# Patient Record
Sex: MALE | Race: BLACK OR AFRICAN AMERICAN | NOT HISPANIC OR LATINO
[De-identification: names, ages, dates, MRNs, and addresses within clinical notes are randomized per-mention and may not be internally consistent; named-entity substitution may affect disease eponyms.]

---

## 2018-04-30 ENCOUNTER — TRANSCRIPTION ENCOUNTER (OUTPATIENT)
Age: 28
End: 2018-04-30

## 2018-05-19 ENCOUNTER — TRANSCRIPTION ENCOUNTER (OUTPATIENT)
Age: 28
End: 2018-05-19

## 2018-05-24 ENCOUNTER — TRANSCRIPTION ENCOUNTER (OUTPATIENT)
Age: 28
End: 2018-05-24

## 2018-06-13 ENCOUNTER — TRANSCRIPTION ENCOUNTER (OUTPATIENT)
Age: 28
End: 2018-06-13

## 2018-06-18 ENCOUNTER — EMERGENCY (EMERGENCY)
Facility: HOSPITAL | Age: 28
LOS: 1 days | Discharge: HOME | End: 2018-06-18
Attending: EMERGENCY MEDICINE
Payer: SELF-PAY

## 2018-06-18 VITALS
DIASTOLIC BLOOD PRESSURE: 78 MMHG | TEMPERATURE: 98 F | RESPIRATION RATE: 18 BRPM | HEART RATE: 66 BPM | OXYGEN SATURATION: 100 % | SYSTOLIC BLOOD PRESSURE: 130 MMHG

## 2018-06-18 VITALS — WEIGHT: 190.04 LBS

## 2018-06-18 LAB
ALBUMIN SERPL ELPH-MCNC: 4.1 G/DL — SIGNIFICANT CHANGE UP (ref 3.3–5.2)
ALP SERPL-CCNC: 85 U/L — SIGNIFICANT CHANGE UP (ref 40–120)
ALT FLD-CCNC: 18 U/L — SIGNIFICANT CHANGE UP
ANION GAP SERPL CALC-SCNC: 12 MMOL/L — SIGNIFICANT CHANGE UP (ref 5–17)
AST SERPL-CCNC: 17 U/L — SIGNIFICANT CHANGE UP
BASOPHILS # BLD AUTO: 0 K/UL — SIGNIFICANT CHANGE UP (ref 0–0.2)
BASOPHILS NFR BLD AUTO: 0.3 % — SIGNIFICANT CHANGE UP (ref 0–2)
BILIRUB SERPL-MCNC: 0.9 MG/DL — SIGNIFICANT CHANGE UP (ref 0.4–2)
BUN SERPL-MCNC: 14 MG/DL — SIGNIFICANT CHANGE UP (ref 8–20)
CALCIUM SERPL-MCNC: 9.1 MG/DL — SIGNIFICANT CHANGE UP (ref 8.6–10.2)
CHLORIDE SERPL-SCNC: 101 MMOL/L — SIGNIFICANT CHANGE UP (ref 98–107)
CO2 SERPL-SCNC: 27 MMOL/L — SIGNIFICANT CHANGE UP (ref 22–29)
CREAT SERPL-MCNC: 1 MG/DL — SIGNIFICANT CHANGE UP (ref 0.5–1.3)
EOSINOPHIL # BLD AUTO: 0 K/UL — SIGNIFICANT CHANGE UP (ref 0–0.5)
EOSINOPHIL NFR BLD AUTO: 0.8 % — SIGNIFICANT CHANGE UP (ref 0–5)
GLUCOSE SERPL-MCNC: 94 MG/DL — SIGNIFICANT CHANGE UP (ref 70–115)
HCT VFR BLD CALC: 45 % — SIGNIFICANT CHANGE UP (ref 42–52)
HGB BLD-MCNC: 15 G/DL — SIGNIFICANT CHANGE UP (ref 14–18)
LYMPHOCYTES # BLD AUTO: 2.3 K/UL — SIGNIFICANT CHANGE UP (ref 1–4.8)
LYMPHOCYTES # BLD AUTO: 35.5 % — SIGNIFICANT CHANGE UP (ref 20–55)
MCHC RBC-ENTMCNC: 30.7 PG — SIGNIFICANT CHANGE UP (ref 27–31)
MCHC RBC-ENTMCNC: 33.3 G/DL — SIGNIFICANT CHANGE UP (ref 32–36)
MCV RBC AUTO: 92 FL — SIGNIFICANT CHANGE UP (ref 80–94)
MONOCYTES # BLD AUTO: 0.8 K/UL — SIGNIFICANT CHANGE UP (ref 0–0.8)
MONOCYTES NFR BLD AUTO: 11.9 % — HIGH (ref 3–10)
NEUTROPHILS # BLD AUTO: 3.3 K/UL — SIGNIFICANT CHANGE UP (ref 1.8–8)
NEUTROPHILS NFR BLD AUTO: 51.3 % — SIGNIFICANT CHANGE UP (ref 37–73)
PLATELET # BLD AUTO: 214 K/UL — SIGNIFICANT CHANGE UP (ref 150–400)
POTASSIUM SERPL-MCNC: 3.7 MMOL/L — SIGNIFICANT CHANGE UP (ref 3.5–5.3)
POTASSIUM SERPL-SCNC: 3.7 MMOL/L — SIGNIFICANT CHANGE UP (ref 3.5–5.3)
PROT SERPL-MCNC: 6.9 G/DL — SIGNIFICANT CHANGE UP (ref 6.6–8.7)
RBC # BLD: 4.89 M/UL — SIGNIFICANT CHANGE UP (ref 4.6–6.2)
RBC # FLD: 13.4 % — SIGNIFICANT CHANGE UP (ref 11–15.6)
SODIUM SERPL-SCNC: 140 MMOL/L — SIGNIFICANT CHANGE UP (ref 135–145)
TROPONIN T SERPL-MCNC: <0.01 NG/ML — SIGNIFICANT CHANGE UP (ref 0–0.06)
WBC # BLD: 6.4 K/UL — SIGNIFICANT CHANGE UP (ref 4.8–10.8)
WBC # FLD AUTO: 6.4 K/UL — SIGNIFICANT CHANGE UP (ref 4.8–10.8)

## 2018-06-18 PROCEDURE — 93010 ELECTROCARDIOGRAM REPORT: CPT

## 2018-06-18 PROCEDURE — 99285 EMERGENCY DEPT VISIT HI MDM: CPT

## 2018-06-18 PROCEDURE — 80053 COMPREHEN METABOLIC PANEL: CPT

## 2018-06-18 PROCEDURE — 36415 COLL VENOUS BLD VENIPUNCTURE: CPT

## 2018-06-18 PROCEDURE — 84484 ASSAY OF TROPONIN QUANT: CPT

## 2018-06-18 PROCEDURE — 71046 X-RAY EXAM CHEST 2 VIEWS: CPT

## 2018-06-18 PROCEDURE — 71046 X-RAY EXAM CHEST 2 VIEWS: CPT | Mod: 26

## 2018-06-18 PROCEDURE — 93005 ELECTROCARDIOGRAM TRACING: CPT

## 2018-06-18 PROCEDURE — 99283 EMERGENCY DEPT VISIT LOW MDM: CPT

## 2018-06-18 PROCEDURE — 85027 COMPLETE CBC AUTOMATED: CPT

## 2018-06-18 NOTE — ED ADULT NURSE NOTE - CHIEF COMPLAINT QUOTE
Intermittent left sided chest pain X 2 days. Seen in [facility] ED 2 months ago for the same and was told his heart rhythm was irregular.  Has appt with cardiologist in two weeks.

## 2018-06-18 NOTE — ED PROVIDER NOTE - OBJECTIVE STATEMENT
27 yo male with no signifcant pmhx c/o chest pain off/on x 1 month. Patient reports episodes of sharp/shock like chest pain to his left chest. denies exertional symptoms. patient states sometimes I eat a cracker and get an episode and sometimes while sleeping. reports sob during the episodes.   denies any pmhx. denies smoker or drug use. denies famhx  patient went to [facility] x 2 weeks ago for similar symptoms, and had full workup including trops and echo. Patient has follow up with cardiology in 2 weeks for stress test and outpatient workup  patient reports few episodes over past 3 days. no pain at present

## 2018-06-18 NOTE — ED ADULT TRIAGE NOTE - CHIEF COMPLAINT QUOTE
Intermittent left sided chest pain X 2 days. Seen in [facility] ED 2 months ago for the same and was told his heart rhythm was irregular.  Has appt with cardiologist in two weeks.

## 2018-06-18 NOTE — ED ADULT NURSE NOTE - OBJECTIVE STATEMENT
pt comes to ED with reports of chest pain intermittently x 1 month but happening again the past 2 days. pt AOX3, ambulatory with no SOB. states he had this happen in past few months ago and was seen in [place]. pt has scheduled cardio appt in 2 weeks. no recent travel, no SOB, lungs CTAB on exam, no fevers, no coughing. pt active, otherwise healthy young man. even and unlabored resps present, skin warm dry and intact.

## 2018-07-12 ENCOUNTER — TRANSCRIPTION ENCOUNTER (OUTPATIENT)
Age: 28
End: 2018-07-12

## 2018-07-12 ENCOUNTER — EMERGENCY (EMERGENCY)
Facility: HOSPITAL | Age: 28
LOS: 1 days | Discharge: HOME | End: 2018-07-12
Attending: EMERGENCY MEDICINE
Payer: COMMERCIAL

## 2018-07-12 VITALS
RESPIRATION RATE: 18 BRPM | DIASTOLIC BLOOD PRESSURE: 86 MMHG | TEMPERATURE: 98 F | SYSTOLIC BLOOD PRESSURE: 138 MMHG | OXYGEN SATURATION: 100 % | HEART RATE: 66 BPM

## 2018-07-12 VITALS — HEIGHT: 68 IN | WEIGHT: 184.97 LBS

## 2018-07-12 LAB
ALBUMIN SERPL ELPH-MCNC: 4.3 G/DL — SIGNIFICANT CHANGE UP (ref 3.3–5.2)
ALP SERPL-CCNC: 93 U/L — SIGNIFICANT CHANGE UP (ref 40–120)
ALT FLD-CCNC: 16 U/L — SIGNIFICANT CHANGE UP
ANION GAP SERPL CALC-SCNC: 14 MMOL/L — SIGNIFICANT CHANGE UP (ref 5–17)
AST SERPL-CCNC: 17 U/L — SIGNIFICANT CHANGE UP
BASOPHILS # BLD AUTO: 0 K/UL — SIGNIFICANT CHANGE UP (ref 0–0.2)
BASOPHILS NFR BLD AUTO: 0.3 % — SIGNIFICANT CHANGE UP (ref 0–2)
BILIRUB SERPL-MCNC: 0.6 MG/DL — SIGNIFICANT CHANGE UP (ref 0.4–2)
BUN SERPL-MCNC: 16 MG/DL — SIGNIFICANT CHANGE UP (ref 8–20)
CALCIUM SERPL-MCNC: 9.3 MG/DL — SIGNIFICANT CHANGE UP (ref 8.6–10.2)
CHLORIDE SERPL-SCNC: 99 MMOL/L — SIGNIFICANT CHANGE UP (ref 98–107)
CO2 SERPL-SCNC: 26 MMOL/L — SIGNIFICANT CHANGE UP (ref 22–29)
CREAT SERPL-MCNC: 0.97 MG/DL — SIGNIFICANT CHANGE UP (ref 0.5–1.3)
EOSINOPHIL # BLD AUTO: 0 K/UL — SIGNIFICANT CHANGE UP (ref 0–0.5)
EOSINOPHIL NFR BLD AUTO: 0.8 % — SIGNIFICANT CHANGE UP (ref 0–5)
GLUCOSE SERPL-MCNC: 109 MG/DL — SIGNIFICANT CHANGE UP (ref 70–115)
HCT VFR BLD CALC: 46 % — SIGNIFICANT CHANGE UP (ref 42–52)
HGB BLD-MCNC: 15.4 G/DL — SIGNIFICANT CHANGE UP (ref 14–18)
LYMPHOCYTES # BLD AUTO: 2 K/UL — SIGNIFICANT CHANGE UP (ref 1–4.8)
LYMPHOCYTES # BLD AUTO: 32.1 % — SIGNIFICANT CHANGE UP (ref 20–55)
MCHC RBC-ENTMCNC: 30.4 PG — SIGNIFICANT CHANGE UP (ref 27–31)
MCHC RBC-ENTMCNC: 33.5 G/DL — SIGNIFICANT CHANGE UP (ref 32–36)
MCV RBC AUTO: 90.9 FL — SIGNIFICANT CHANGE UP (ref 80–94)
MONOCYTES # BLD AUTO: 0.8 K/UL — SIGNIFICANT CHANGE UP (ref 0–0.8)
MONOCYTES NFR BLD AUTO: 13.6 % — HIGH (ref 3–10)
NEUTROPHILS # BLD AUTO: 3.2 K/UL — SIGNIFICANT CHANGE UP (ref 1.8–8)
NEUTROPHILS NFR BLD AUTO: 52.9 % — SIGNIFICANT CHANGE UP (ref 37–73)
PLATELET # BLD AUTO: 244 K/UL — SIGNIFICANT CHANGE UP (ref 150–400)
POTASSIUM SERPL-MCNC: 3.9 MMOL/L — SIGNIFICANT CHANGE UP (ref 3.5–5.3)
POTASSIUM SERPL-SCNC: 3.9 MMOL/L — SIGNIFICANT CHANGE UP (ref 3.5–5.3)
PROT SERPL-MCNC: 7.4 G/DL — SIGNIFICANT CHANGE UP (ref 6.6–8.7)
RBC # BLD: 5.06 M/UL — SIGNIFICANT CHANGE UP (ref 4.6–6.2)
RBC # FLD: 13.1 % — SIGNIFICANT CHANGE UP (ref 11–15.6)
SODIUM SERPL-SCNC: 139 MMOL/L — SIGNIFICANT CHANGE UP (ref 135–145)
TROPONIN T SERPL-MCNC: <0.01 NG/ML — SIGNIFICANT CHANGE UP (ref 0–0.06)
WBC # BLD: 6.1 K/UL — SIGNIFICANT CHANGE UP (ref 4.8–10.8)
WBC # FLD AUTO: 6.1 K/UL — SIGNIFICANT CHANGE UP (ref 4.8–10.8)

## 2018-07-12 PROCEDURE — 36415 COLL VENOUS BLD VENIPUNCTURE: CPT

## 2018-07-12 PROCEDURE — 71046 X-RAY EXAM CHEST 2 VIEWS: CPT

## 2018-07-12 PROCEDURE — 80053 COMPREHEN METABOLIC PANEL: CPT

## 2018-07-12 PROCEDURE — 85027 COMPLETE CBC AUTOMATED: CPT

## 2018-07-12 PROCEDURE — 99283 EMERGENCY DEPT VISIT LOW MDM: CPT | Mod: 25

## 2018-07-12 PROCEDURE — 99285 EMERGENCY DEPT VISIT HI MDM: CPT

## 2018-07-12 PROCEDURE — 93010 ELECTROCARDIOGRAM REPORT: CPT

## 2018-07-12 PROCEDURE — 84484 ASSAY OF TROPONIN QUANT: CPT

## 2018-07-12 PROCEDURE — 93005 ELECTROCARDIOGRAM TRACING: CPT

## 2018-07-12 PROCEDURE — 71046 X-RAY EXAM CHEST 2 VIEWS: CPT | Mod: 26

## 2018-07-12 RX ORDER — SODIUM CHLORIDE 9 MG/ML
1000 INJECTION INTRAMUSCULAR; INTRAVENOUS; SUBCUTANEOUS ONCE
Qty: 0 | Refills: 0 | Status: COMPLETED | OUTPATIENT
Start: 2018-07-12 | End: 2018-07-12

## 2018-07-12 RX ORDER — ACETAMINOPHEN 500 MG
975 TABLET ORAL ONCE
Qty: 0 | Refills: 0 | Status: COMPLETED | OUTPATIENT
Start: 2018-07-12 | End: 2018-07-12

## 2018-07-12 RX ADMIN — SODIUM CHLORIDE 1000 MILLILITER(S): 9 INJECTION INTRAMUSCULAR; INTRAVENOUS; SUBCUTANEOUS at 21:20

## 2018-07-12 RX ADMIN — Medication 975 MILLIGRAM(S): at 21:19

## 2018-07-12 NOTE — ED ADULT TRIAGE NOTE - CHIEF COMPLAINT QUOTE
Pt axox3 sent to ED from urgent care for abnormal ekg. Pt states he has been having  intermittent chest pain  x 1 year, denies cardiac history and has no family history  of cardiac issues. Pt describes pain as tightness, denies any recent long periods of travel

## 2018-07-12 NOTE — ED PROVIDER NOTE - OBJECTIVE STATEMENT
29 yo male with no known pmh presents with chest pain that has been intermittent for about one year. He has not had pain for about one month and last night his pain woke him up from his sleep. He states it lasted for about 10 minutes and then subsided allowing him to rest. He described the pain as a sharp pain that is on the ight side of his chest and radiates to the middle. He rated us a 7/10 and does not note anything that aggravates or relieves the pain. Today he had another episode of pain while typing at work and once again it subsided after about 10 minutes. During the exam he states to have a tight/pressure pain present to the right side of the chest. He states this is the first time he had chest pain that woke him up from his sleep. He admits to SOB when the pain is present but denies any other symptoms including n/v, fever, chills, cough, or abdominal pain.

## 2018-07-12 NOTE — ED ADULT NURSE NOTE - OBJECTIVE STATEMENT
pt went to  urgent center for eval of intermittent chest pain for over 1 year. ref in for eval of abnormal ekg, denies chest pain , sob, diap, rad or sob. denies heavy lifting or trauma. skin warm and dry.

## 2018-08-15 ENCOUNTER — EMERGENCY (EMERGENCY)
Facility: HOSPITAL | Age: 28
LOS: 1 days | Discharge: HOME | End: 2018-08-15
Attending: EMERGENCY MEDICINE
Payer: COMMERCIAL

## 2018-08-15 VITALS
DIASTOLIC BLOOD PRESSURE: 88 MMHG | RESPIRATION RATE: 18 BRPM | SYSTOLIC BLOOD PRESSURE: 151 MMHG | WEIGHT: 179.9 LBS | OXYGEN SATURATION: 100 % | HEART RATE: 71 BPM | TEMPERATURE: 98 F | HEIGHT: 69 IN

## 2018-08-15 PROCEDURE — 93010 ELECTROCARDIOGRAM REPORT: CPT

## 2018-08-15 PROCEDURE — 84484 ASSAY OF TROPONIN QUANT: CPT

## 2018-08-15 PROCEDURE — 93005 ELECTROCARDIOGRAM TRACING: CPT

## 2018-08-15 PROCEDURE — 71046 X-RAY EXAM CHEST 2 VIEWS: CPT

## 2018-08-15 PROCEDURE — 99285 EMERGENCY DEPT VISIT HI MDM: CPT | Mod: 25

## 2018-08-15 PROCEDURE — 36415 COLL VENOUS BLD VENIPUNCTURE: CPT

## 2018-08-15 PROCEDURE — 80053 COMPREHEN METABOLIC PANEL: CPT

## 2018-08-15 NOTE — ED ADULT TRIAGE NOTE - CHIEF COMPLAINT QUOTE
pt a+ox4, brought to ED in [employer] badge number [id number] c/o chest pain. pt states "roe have chest pain all day but it recently got worse. I feel SOB too and a little dizzy."

## 2018-08-16 LAB
ALBUMIN SERPL ELPH-MCNC: 4.2 G/DL — SIGNIFICANT CHANGE UP (ref 3.3–5.2)
ALP SERPL-CCNC: 81 U/L — SIGNIFICANT CHANGE UP (ref 40–120)
ALT FLD-CCNC: 14 U/L — SIGNIFICANT CHANGE UP
ANION GAP SERPL CALC-SCNC: 11 MMOL/L — SIGNIFICANT CHANGE UP (ref 5–17)
AST SERPL-CCNC: 18 U/L — SIGNIFICANT CHANGE UP
BILIRUB SERPL-MCNC: 0.8 MG/DL — SIGNIFICANT CHANGE UP (ref 0.4–2)
BUN SERPL-MCNC: 19 MG/DL — SIGNIFICANT CHANGE UP (ref 8–20)
CALCIUM SERPL-MCNC: 9.2 MG/DL — SIGNIFICANT CHANGE UP (ref 8.6–10.2)
CHLORIDE SERPL-SCNC: 102 MMOL/L — SIGNIFICANT CHANGE UP (ref 98–107)
CO2 SERPL-SCNC: 27 MMOL/L — SIGNIFICANT CHANGE UP (ref 22–29)
CREAT SERPL-MCNC: 0.92 MG/DL — SIGNIFICANT CHANGE UP (ref 0.5–1.3)
GLUCOSE SERPL-MCNC: 98 MG/DL — SIGNIFICANT CHANGE UP (ref 70–115)
POTASSIUM SERPL-MCNC: 4 MMOL/L — SIGNIFICANT CHANGE UP (ref 3.5–5.3)
POTASSIUM SERPL-SCNC: 4 MMOL/L — SIGNIFICANT CHANGE UP (ref 3.5–5.3)
PROT SERPL-MCNC: 7.4 G/DL — SIGNIFICANT CHANGE UP (ref 6.6–8.7)
SODIUM SERPL-SCNC: 140 MMOL/L — SIGNIFICANT CHANGE UP (ref 135–145)
TROPONIN T SERPL-MCNC: <0.01 NG/ML — SIGNIFICANT CHANGE UP (ref 0–0.06)

## 2018-08-16 PROCEDURE — 71046 X-RAY EXAM CHEST 2 VIEWS: CPT | Mod: 26

## 2018-08-16 NOTE — ED ADULT NURSE NOTE - NSIMPLEMENTINTERV_GEN_ALL_ED
Implemented All Universal Safety Interventions:  [place] to call system. Call bell, personal items and telephone within reach. Instruct patient to call for assistance. Room bathroom lighting operational. Non-slip footwear when patient is off stretcher. Physically safe environment: no spills, clutter or unnecessary equipment. Stretcher in lowest position, wheels locked, appropriate side rails in place.

## 2018-08-16 NOTE — ED ADULT NURSE NOTE - CHIEF COMPLAINT QUOTE
pt a+ox4, brought to ED in [employer] badge number [id number] c/o chest pain. pt states "roe have chest pain all day but it recently got worse. I feel SOB too and a little dizzy."

## 2018-08-16 NOTE — ED PROVIDER NOTE - MEDICAL DECISION MAKING DETAILS
pt with complaint of chest pain. EKG appears unchanged from previous. will check trop/cmp and CXR. if all negative will DC and outpatient follow up with cardiologist who pt sees regularly

## 2018-08-16 NOTE — ED PROVIDER NOTE - OBJECTIVE STATEMENT
27 yo male with no known pmh presents with chest pain (had had prior visits to the ER with chest pain complaints). Pt follows with outpatient cardiologist regularly.. pt states has had extensive work up in the past with negative findings other than abnormal ekg.  Pt states chest pain frequently comes and goes, though came to ER because this time the chest pain has been lasting between 3-4 hours before relief. He describes the pain as a sharp pain that is on the right side of his chest and radiates to the middle/left.  He does not note anything that aggravates or improves chest pain. Pt also mentions SOB. Pt otherwise denies all other medical complaints. VSS. [facility], appears unchanged from prior EKGS with no acute concern for ischemia. Pt has had 3 visits now in 3 months for chest pain .

## 2018-08-16 NOTE — ED ADULT NURSE NOTE - OBJECTIVE STATEMENT
Patient A&Ox4 complaining of excessive chest pain that began appx 5 hours ago when having an argument with SO. Patient was asleep, resting comfortably in negative obvious distress upon RN approach. Patient skin color temp condition all normal, patient speaking clearly. Patient in custody of [facility] #[id number], handcuffs in place to left wrist, positive pulse, motor, sensory to extremity. Respirations even & unlabored, denies any numbness or tingling. Denies any shortness of breath, nausea or dizziness. Patient stated has been seen in ED for similar pain in [place] & July of this year, all findings negative, has not followed up with any outside care.

## 2018-08-16 NOTE — ED PROVIDER NOTE - ATTENDING CONTRIBUTION TO CARE
29 yo M presents to ED in custody of [employer] c/o CP.  Pt with multiple previous ED visits for CP with neg w/u.  No significant {PMH or risk factors for CAD. On exam pt awake and alert in NAD, Cor Reg, Lungs clear, Abd soft. NT, Ext no edema.  EKG NSR with no acute changes, Will check CXR and Trop and if neg d/c in custody of [employer]

## 2018-08-16 NOTE — ED PROVIDER NOTE - CARE PLAN
Principal Discharge DX:	Chest pain  Assessment and plan of treatment:	follow up outpatient cardio  work up negative   table for DC

## 2018-08-16 NOTE — ED PROVIDER NOTE - PROGRESS NOTE DETAILS
labs reviewed. Trop negative. Awaiting xray labs reviewed. Trop negative. xray prelim appears to be negative for any acute pathology. pt stable for DC.

## 2018-08-16 NOTE — ED PROVIDER NOTE - PHYSICAL EXAMINATION
PE: General: NAD, sitting up in bed, well groomed in nightgown. Eyes: PERRLA, EOM intact. Neck: Supple and symmetrical, no JVD. CV: RRR, no m/r/g. Lungs: CTA b/l, no use of accessory muscles, no wheezes, rales, rhonchi. Skin: warm, dry, no rashes. Abdomen: Normal BS, soft, NT/ND. Extremities: no edema, cyanosis. Musculoskeletal: moving all extremities. hand cuff to left wrist. Neuro: A & O X 3, CN 2-12 grossly intact, no sensory or motor deficit.

## 2018-09-01 ENCOUNTER — TRANSCRIPTION ENCOUNTER (OUTPATIENT)
Age: 28
End: 2018-09-01

## 2019-11-17 NOTE — ED PROVIDER NOTE - RESPIRATORY [-], MLM
Hot compress 15  Min 4 times daily, use polytrim 2 drops left eye 4 times daily, if not improving in 3-5 days follow-up with ophthalmology.   Please follow-up with Ophthalmologist    Dr. Kim Laird- Address: [address], [facility], [address] Phone: [phone]     Patient Education     Chalazion (Child)  A chalazion is a blocked, swollen oil gland in the eyelid. The eyelids have oil glands that lubricate the inside of the lids. If a gland becomes blocked, the oil builds up and causes the skin to swell.  A chalazion can vary in size. It may appear on the inside or outside of the lid. In most cases, it occurs on the upper lid. The skin may be a normal color or may be red. A chalazion is usually not painful, but it can cause discomfort, tenderness, sensitivity to light, eye discharge, and increased tearing.  A chalazion usually lasts from a few weeks to a month. It often goes away on its own. A chalazion can be mistaken for a sty (infection of an oil gland) because they both appear on the eyelid.  Why a chalazion forms  It’s often unclear why a chalazion appears. But a chalazion can develop when you have any of the following conditions:  · Chronic blepharitis, when eyelids become irritated  · Acne rosacea  · Seborrhea  · Tuberculosis  · Viral infection  Home care  If your child’s healthcare provider finds that a chalazion is infected, he or she may prescribe an antibiotic drop or ointment. Follow all instructions for giving this medicine to your child. Don’t give any medicine for this condition without first asking your child’s healthcare provider.  How to give eye medicine  · Using eye drops: Apply drops in the corner of the eye where the eyelid meets the nose. The drops will pool in this area. When your child blinks or opens his or her eyelid, the drops will flow into the eye. Use the exact number of drops prescribed. Be careful not to touch the eye or eyelashes with the dropper.  · Using  ointment: If both drops and ointment are prescribed, give the drops first. Wait 3 minutes, then apply the ointment. Doing this will give each medicine time to work. To apply the ointment, start by gently pulling down the lower lid. Place a thin line of ointment along the inside of the lid. Begin at the nose and move outward. Close the lid. Wipe away excess medicine from the nose area outward. This is to keep the eyes as clean as possible. Have your child keep the eye closed for 1 or 2 minutes, so the medicine has time to coat the eye. Eye ointment may cause blurry vision. This is normal. Apply ointment right before your child goes to sleep. If your child is an infant, ointment may be easier to apply while he or she is sleeping.  Follow these guidelines when caring for your child at home:  · Wash your hands carefully with soap and warm water before and after caring for your child’s eyes. This is to help prevent infection.  · Apply a warm moist towel or compress for 10 to 15 minutes, 3 to 4 times a day. A warm compress is a clean towel damp with warm water.  · After the warm compress or as directed by the healthcare provider, gently massage the area to help drain the chalazion. An older child may be able to massage his or her own eyelid with adult supervision.  · You and your child should never try to pop or squeeze the chalazion.  · As applicable, your child should not wear eye makeup until the chalazion has healed, or follow your healthcare provider’s directions.  · As applicable, your child should not wear contact lens until the chalazion has healed, or follow your healthcare provider’s directions.  · Once a day, with eyes closed, clean your child's eyelids with baby shampoo or a moist eyelid cleansing wipe. Ask your healthcare provider about products to clean eyelids. This helps prevent the return of a chalazion and clogging of the eyelid duct.  · Encourage your child to wash his or her hands regularly. This helps  reduce the chance of dirt and bacteria coming into contact with the eyelid.  Follow-up care  Follow up with your child’s healthcare provider, or as advised. If the chalazion does not heal in 4 weeks, your child may be referred to a healthcare provider who specializes in eye care (an optometrist or ophthalmologist) for further evaluation and treatment. Your child may also see an eye specialist if he or she has a large chalazion.  Special note for parents  Carefully wash your hands with soap and warm water before and after caring for your child’s eyes, to avoid spreading infection. Make sure that your child washes his or her own hands before and after touching the eyelid.  When to seek medical advice  For a usually healthy child, call your child's healthcare provider right away if any of these occur:  · Your child is of any age and has repeated fevers above 104°F (40°C)  · Your child is younger than 2 years of age and has a fever of 100.4°F (38°C) that continues for more than 1 day  · Your child is 2 years old or older and has a fever of 100.4°F (38°C) that continues for more than 3 days  · Chalazion returns to the same area repeatedly  · Existing symptoms (such as pain, warmth, redness, and drainage) don’t get better, or get worse  · New symptoms appear, such as eye pain, constant headaches, warmth or redness around the eye, drainage, or both the upper and lower lids of the same eye swelling  · Vision changes, such as trouble seeing or blurred vision  Call 911  Call 911 if any of these occur:  · Trouble breathing  · Confusion  · Extreme drowsiness or trouble awakening  · Fainting or loss of consciousness  · Rapid heart rate  · Seizure  · Stiff neck  Date Last Reviewed: 10/9/2015  © 6298-7173 [employer]. [address] [place] [address], [place] [address]. All rights reserved. This information is not intended as a substitute for professional medical care. Always follow your healthcare professional's  instructions.              no cough

## 2020-01-30 ENCOUNTER — TRANSCRIPTION ENCOUNTER (OUTPATIENT)
Age: 30
End: 2020-01-30

## 2020-06-28 ENCOUNTER — EMERGENCY (EMERGENCY)
Facility: HOSPITAL | Age: 30
LOS: 1 days | Discharge: HOME | End: 2020-06-28
Attending: EMERGENCY MEDICINE
Payer: COMMERCIAL

## 2020-06-28 VITALS
SYSTOLIC BLOOD PRESSURE: 128 MMHG | HEART RATE: 78 BPM | RESPIRATION RATE: 18 BRPM | DIASTOLIC BLOOD PRESSURE: 90 MMHG | TEMPERATURE: 98 F | HEIGHT: 68 IN | WEIGHT: 195.11 LBS | OXYGEN SATURATION: 97 %

## 2020-06-28 LAB
ALBUMIN SERPL ELPH-MCNC: 4.1 G/DL — SIGNIFICANT CHANGE UP (ref 3.3–5.2)
ALP SERPL-CCNC: 105 U/L — SIGNIFICANT CHANGE UP (ref 40–120)
ALT FLD-CCNC: 17 U/L — SIGNIFICANT CHANGE UP
ANION GAP SERPL CALC-SCNC: 10 MMOL/L — SIGNIFICANT CHANGE UP (ref 5–17)
AST SERPL-CCNC: 22 U/L — SIGNIFICANT CHANGE UP
BASOPHILS # BLD AUTO: 0.04 K/UL — SIGNIFICANT CHANGE UP (ref 0–0.2)
BASOPHILS NFR BLD AUTO: 0.8 % — SIGNIFICANT CHANGE UP (ref 0–2)
BILIRUB SERPL-MCNC: 0.6 MG/DL — SIGNIFICANT CHANGE UP (ref 0.4–2)
BUN SERPL-MCNC: 16 MG/DL — SIGNIFICANT CHANGE UP (ref 8–20)
CALCIUM SERPL-MCNC: 9 MG/DL — SIGNIFICANT CHANGE UP (ref 8.6–10.2)
CHLORIDE SERPL-SCNC: 101 MMOL/L — SIGNIFICANT CHANGE UP (ref 98–107)
CO2 SERPL-SCNC: 26 MMOL/L — SIGNIFICANT CHANGE UP (ref 22–29)
CREAT SERPL-MCNC: 1.05 MG/DL — SIGNIFICANT CHANGE UP (ref 0.5–1.3)
EOSINOPHIL # BLD AUTO: 0.1 K/UL — SIGNIFICANT CHANGE UP (ref 0–0.5)
EOSINOPHIL NFR BLD AUTO: 1.9 % — SIGNIFICANT CHANGE UP (ref 0–6)
GLUCOSE SERPL-MCNC: 99 MG/DL — SIGNIFICANT CHANGE UP (ref 70–99)
HCT VFR BLD CALC: 44.8 % — SIGNIFICANT CHANGE UP (ref 39–50)
HGB BLD-MCNC: 15 G/DL — SIGNIFICANT CHANGE UP (ref 13–17)
IMM GRANULOCYTES NFR BLD AUTO: 0.2 % — SIGNIFICANT CHANGE UP (ref 0–1.5)
LYMPHOCYTES # BLD AUTO: 2.38 K/UL — SIGNIFICANT CHANGE UP (ref 1–3.3)
LYMPHOCYTES # BLD AUTO: 44.7 % — HIGH (ref 13–44)
MCHC RBC-ENTMCNC: 30.2 PG — SIGNIFICANT CHANGE UP (ref 27–34)
MCHC RBC-ENTMCNC: 33.5 GM/DL — SIGNIFICANT CHANGE UP (ref 32–36)
MCV RBC AUTO: 90.1 FL — SIGNIFICANT CHANGE UP (ref 80–100)
MONOCYTES # BLD AUTO: 0.66 K/UL — SIGNIFICANT CHANGE UP (ref 0–0.9)
MONOCYTES NFR BLD AUTO: 12.4 % — SIGNIFICANT CHANGE UP (ref 2–14)
NEUTROPHILS # BLD AUTO: 2.13 K/UL — SIGNIFICANT CHANGE UP (ref 1.8–7.4)
NEUTROPHILS NFR BLD AUTO: 40 % — LOW (ref 43–77)
PLATELET # BLD AUTO: 230 K/UL — SIGNIFICANT CHANGE UP (ref 150–400)
POTASSIUM SERPL-MCNC: 4.1 MMOL/L — SIGNIFICANT CHANGE UP (ref 3.5–5.3)
POTASSIUM SERPL-SCNC: 4.1 MMOL/L — SIGNIFICANT CHANGE UP (ref 3.5–5.3)
PROT SERPL-MCNC: 6.9 G/DL — SIGNIFICANT CHANGE UP (ref 6.6–8.7)
RBC # BLD: 4.97 M/UL — SIGNIFICANT CHANGE UP (ref 4.2–5.8)
RBC # FLD: 13.6 % — SIGNIFICANT CHANGE UP (ref 10.3–14.5)
SODIUM SERPL-SCNC: 136 MMOL/L — SIGNIFICANT CHANGE UP (ref 135–145)
TROPONIN T SERPL-MCNC: <0.01 NG/ML — SIGNIFICANT CHANGE UP (ref 0–0.06)
WBC # BLD: 5.32 K/UL — SIGNIFICANT CHANGE UP (ref 3.8–10.5)
WBC # FLD AUTO: 5.32 K/UL — SIGNIFICANT CHANGE UP (ref 3.8–10.5)

## 2020-06-28 PROCEDURE — 80053 COMPREHEN METABOLIC PANEL: CPT

## 2020-06-28 PROCEDURE — 93010 ELECTROCARDIOGRAM REPORT: CPT

## 2020-06-28 PROCEDURE — 71046 X-RAY EXAM CHEST 2 VIEWS: CPT

## 2020-06-28 PROCEDURE — 85027 COMPLETE CBC AUTOMATED: CPT

## 2020-06-28 PROCEDURE — 99284 EMERGENCY DEPT VISIT MOD MDM: CPT | Mod: 25

## 2020-06-28 PROCEDURE — 71046 X-RAY EXAM CHEST 2 VIEWS: CPT | Mod: 26

## 2020-06-28 PROCEDURE — 93005 ELECTROCARDIOGRAM TRACING: CPT

## 2020-06-28 PROCEDURE — 84484 ASSAY OF TROPONIN QUANT: CPT

## 2020-06-28 PROCEDURE — 99285 EMERGENCY DEPT VISIT HI MDM: CPT

## 2020-06-28 PROCEDURE — 36415 COLL VENOUS BLD VENIPUNCTURE: CPT

## 2020-06-28 NOTE — ED ADULT TRIAGE NOTE - CHIEF COMPLAINT QUOTE
patient states that he has been having chest pain since this am, radiating to left arm without cardiac HX denies N/V/SOB

## 2020-06-28 NOTE — ED PROVIDER NOTE - CLINICAL SUMMARY MEDICAL DECISION MAKING FREE TEXT BOX
31yo M c/o chest pain x 1 day. Well appearing, in NAD. No cardiac risk factors. No family hx. EKG non-ischemic. Plan to check cxr, labs, trop x 1 and f/u ss cardiology as outpt if workup negative

## 2020-06-28 NOTE — ED ADULT NURSE NOTE - CHPI ED NUR SYMPTOMS NEG
no chills/no congestion/no nausea/no syncope/no shortness of breath/no back pain/no diaphoresis/no dizziness/no fever/no vomiting

## 2020-06-28 NOTE — ED PROVIDER NOTE - PROGRESS NOTE DETAILS
labs unremarkable, trop negative. pt provided copy of results and instructed to f/u ss cardiology, referral provided. Return precautions given, stable for dc.

## 2020-06-28 NOTE — ED PROVIDER NOTE - NS ED ROS FT
Gen: denies fever, chills, fatigue, weight loss  Skin: denies rashes, laceration, bruising  HEENT: denies visual changes, ear pain, nasal congestion, throat pain  Respiratory: denies EASON, SOB, cough, wheezing  Cardiovascular: +CHEST PAIN. Denies palpitations, diaphoresis, LE edema  GI: denies abdominal pain, n/v/d  : denies dysuria, frequency, urgency, bowel/bladder incontinence  MSK: denies joint swelling/pain, back pain, neck pain  Neuro: denies headache, dizziness, weakness, numbness  Psych: denies anxiety, depression, SI/HI, visual/auditory hallucinations

## 2020-06-28 NOTE — ED PROVIDER NOTE - PHYSICAL EXAMINATION
Gen: WD/WN, NAD, non-toxic  Head: NCAT  Cardiac: RRR +S1S2.   Resp: Speaking in full sentences. No evidence of respiratory distress. No wheezes, rales or rhonchi.  Abd: +BS x 4. Soft, non-tender, non-distended. No guarding or rebound.  Back: Spine midline and non-tender. No CVAT.  MSK: FROM extremities x 4, no bony ttp  Skin: Warm, dry, no rashes or lesions  Neuro: Awake, alert & oriented x 3. No focal deficits, ambulatory with steady gait

## 2020-06-28 NOTE — ED PROVIDER NOTE - PATIENT PORTAL LINK FT
You can access the FollowMyHealth Patient Portal offered by [facility] by registering at the following website: [email or web address]. By joining [employer]’s FollowMyHealth portal, you will also be able to view your health information using other applications (apps) compatible with our system.

## 2020-06-28 NOTE — ED ADULT NURSE NOTE - OBJECTIVE STATEMENT
Pt. presents alert and oriented x 4 to ED complaining of episodes of chest pain earlier yesterday. At this time, pt denies pain. Pt. states his pain started when he was relaxing at home, pt reports "I took Motrin but it didn't help." Pt. states he also went for a walk but it didn't help, however it also did not make it worse. Pt. reports nothing as making the pain better or worse. Pt. states he went to bed last night and the pain woke him up out of his sleep, pt. states the pain was squeezing and radiated down his L. arm. At this time, pt in NSR on cardiac monitor. VSS. Skin warm and dry. No noted diaphoresis or pallor. Pt. GRAMAJO, ambulatory. Breathing spontaneous and unlabored on room air. Pt. denies N/V/D, difficulty breathing, dizziness, syncope.

## 2020-06-28 NOTE — ED PROVIDER NOTE - ATTENDING CONTRIBUTION TO CARE
Pt with no risk factors for ACS or PE, having mild chest discomfort today, no respiratory or GI symptoms.  ECG is unchanged from his prior and he has had recent cardiac work up for abnormal ECG. Troponin is negative. Do not suspect pedro pablo or myocarditis. Can follow up with his cardiologist if needed, otherwise supportive care, NSAIDs, return precautions.

## 2020-06-28 NOTE — ED PROVIDER NOTE - OBJECTIVE STATEMENT
31yo M no pmhx presents to ED c/o chest pain x 1 day. Pt noting left sided CP onset yesterday AM, persistent throughout day. States pain feels like a throbbing sensation, currently 4/10 in severity. No associated sob. Not worse with inspiration or positional change. Pt went to  yesterday, had ekg done and was told to come to ED because it looked "abnormal" but pt waited because he has been told in past his ekg is abnormal. Pt states he tried to rest and ignore pain but approx 45 minutes PTA pain woke pt from sleep, now with left arm pain as well. Pt states he followed up with a cardiologist in [place] 2 years ago due to ekg, had echo, stress test and holter monitor- all were normal. Non-smoker. Admits to occasional alcohol consumption, increased due to quarantine. No family hx CAD. Denies fever, sob, palpitations, le edema, numbness, ha, dizziness, abdominal pain, n/v/d, recent illness.

## 2020-06-28 NOTE — ED PROVIDER NOTE - NSFOLLOWUPCLINICS_GEN_ALL_ED_FT
[facility]  Cardiology  [address] [place] [address]  Phone: [phone]  Fax:   Follow Up Time:

## 2020-10-13 ENCOUNTER — EMERGENCY (EMERGENCY)
Facility: HOSPITAL | Age: 30
LOS: 1 days | Discharge: HOME | End: 2020-10-13
Attending: EMERGENCY MEDICINE
Payer: COMMERCIAL

## 2020-10-13 VITALS
DIASTOLIC BLOOD PRESSURE: 89 MMHG | RESPIRATION RATE: 14 BRPM | HEIGHT: 68 IN | SYSTOLIC BLOOD PRESSURE: 144 MMHG | WEIGHT: 197.98 LBS | OXYGEN SATURATION: 100 % | HEART RATE: 71 BPM | TEMPERATURE: 98 F

## 2020-10-13 PROCEDURE — 99282 EMERGENCY DEPT VISIT SF MDM: CPT

## 2020-10-13 NOTE — ED PROVIDER NOTE - ADDITIONAL NOTES AND INSTRUCTIONS:
PT was evaluated At [facility] ED and was found to have a condition that warranted time of to rest and heal from WORK/SCHOOL.   Burak Alvarez PA-C

## 2020-10-13 NOTE — ED PROVIDER NOTE - CHPI ED SYMPTOMS NEG
no chest pain/no cough/no chills/no back pain/no dizziness/no fever/no diaphoresis/no nausea/no syncope/no shortness of breath/no vomiting

## 2020-10-13 NOTE — ED PROVIDER NOTE - ATTENDING CONTRIBUTION TO CARE
I, Patric Echeverria, have personally performed a face to face diagnostic evaluation on this patient. I have reviewed the ACP note and agree with the history, exam and plan of care, except as noted.    29 yo M p/w hypertension. Patient not on medication. asymptomatic. BP normotensive in the ED. patient given cardiology follow up.

## 2020-10-13 NOTE — ED PROVIDER NOTE - CLINICAL SUMMARY MEDICAL DECISION MAKING FREE TEXT BOX
PT with stable VS, no acute distress, non toxic appearing, tolerating PO in the ED, Pt informed of elevated BP and the need for further follow up to PCP for management has no CP, SOB, diff breathing, cough, ABD pain, dizziness, change in vision, back pain, HA. PT educated that at this time there is no indication for emergent management of BP, and the importance ot BP control over long term due to potential for long term complication from uncontrolled HTN. PT verbalizes that they understand all information given to them. educated about when to return to the ED if needed. PT verbalizes that he understands all instructions and results. Pt informed that ED is open and available 24/7 365 days a yr, encouraged to return to the ED if they have any change in condition, or feel the need for revaluation.

## 2020-10-13 NOTE — ED PROVIDER NOTE - OBJECTIVE STATEMENT
PT with no SPMHx presents to the ED with complaint of elevated BP since yesterday. Pt states that he states he was feeling "off" so he went to [facility] and was found to be hypertensive was dc home with supportive care. Pt states that he took his BP at home today multiple times and felt that his BP was elevated highest finding was 144/100mmHg. Pt states that he feels completely normal now and has no complaints, Pt dines numbness, tinglign, HA, dizziness, SOB, CP, change in vision, ABD pain, cough.

## 2020-10-13 NOTE — ED PROVIDER NOTE - NSFOLLOWUPCLINICS_GEN_ALL_ED_FT
[facility]  Cardiology  [address] [place] [address]  Phone: [phone]  Fax:   Follow Up Time:

## 2020-10-13 NOTE — ED PROVIDER NOTE - NSFOLLOWUPINSTRUCTIONS_ED_ALL_ED_FT
Patient education: High blood pressure emergencies (The Basics)  View in Iranian  Written by the doctors and editors at [facility]  What is a high blood pressure emergency?  A high blood pressure emergency is a serious – and even life-threatening – condition that can happen when a person's blood pressure gets much higher than normal. When a person's blood pressure gets very high, it can lead to problems in one or more of the following organs:    ?Eyes – Problems can include bleeding in the back of the eye, or swelling of the nerve that runs from the eye to the brain.    ?Brain – Problems can include swelling or bleeding in the brain, or a stroke. A stroke is when a part of the brain is damaged because of a problem with blood flow.    ?Kidneys – Very high blood pressure can lead to kidney failure, which is when the kidneys stop working.    ?Heart – Heart problems can include a heart attack, heart failure, or damage to a major blood vessel.    When your doctor or nurse tells you your blood pressure, they say 2 numbers. For example, your doctor might say that your blood pressure is "140 over 90." When people have a high blood pressure emergency, their blood pressure is usually "180 over 120" or higher.    Other terms doctors might use for a high blood pressure emergency are "hypertensive emergency" or "malignant hypertension."    Sometimes, a person's blood pressure is much higher than normal, but it hasn't damaged any organs. Doctors call this "hypertensive urgency." Hypertensive urgency is not usually treated the same as a high blood pressure emergency.    What are the symptoms of a high blood pressure emergency?  The symptoms depend on the organ or organs affected. They can include:    ?Blurry vision or other vision changes    ?Headache    ?Nausea or vomiting    ?Confusion    ?Passing out or seizures – Seizures are waves of abnormal electrical activity in the brain that can make people move or behave strangely.    ?Weakness or numbness on one side of the body, or in one arm or leg    ?Difficulty talking    ?Trouble breathing    ?Chest pain    ?Pain in the upper back or between the shoulders    ?Urine that is brown or bloody    ?Pain in the lower back or on the side of the body    Should I see a doctor or nurse?  Yes. Call your doctor or nurse right away if you have any of the symptoms listed above, especially if you know that you have high blood pressure.    Will I need tests?  Yes. Your doctor or nurse will ask about your symptoms, do an exam, and check your blood pressure. They might use a special light to look in the back of your eyes.    Your doctor will also do tests to check how serious your condition is. Tests can include:    ?Blood tests    ?Urine tests    ?A chest X-ray    ?A CT scan or other imaging test of your brain – Imaging tests create pictures of the inside of the body.    ?A CT scan or other imaging test of your chest    ?An ECG (also called an "electrocardiogram") – This test measures the electrical activity in your heart (figure 1).    How is a high blood pressure emergency treated?  A high blood pressure emergency is treated in the hospital. Your doctor will give you medicines to lower your blood pressure quickly. These medicines are usually given through a thin tube that goes into your vein, called an "IV."    Your doctor will also treat any problems caused by your very high blood pressure, if they can be treated.    People who have a high blood pressure emergency usually need long-term treatment to keep their blood pressure under control. This usually includes:    ?Taking medicines    ?Following a low-salt diet that includes a lot of fruits and vegetables    ?Losing weight (if you are overweight)    ?Getting regular exercise

## 2020-10-13 NOTE — ED PROVIDER NOTE - PATIENT PORTAL LINK FT
You can access the FollowMyHealth Patient Portal offered by [facility] by registering at the following website: [email or web address]. By joining [employer]’s FollowMyHealth portal, you will also be able to view your health information using other applications (apps) compatible with our system.

## 2020-12-16 ENCOUNTER — EMERGENCY (EMERGENCY)
Facility: HOSPITAL | Age: 30
LOS: 1 days | Discharge: HOME | End: 2020-12-16
Payer: COMMERCIAL

## 2020-12-16 VITALS
HEART RATE: 79 BPM | RESPIRATION RATE: 18 BRPM | HEIGHT: 68 IN | SYSTOLIC BLOOD PRESSURE: 158 MMHG | WEIGHT: 199.96 LBS | DIASTOLIC BLOOD PRESSURE: 95 MMHG | OXYGEN SATURATION: 100 % | TEMPERATURE: 98 F

## 2020-12-16 LAB
S PYO DNA THROAT QL NAA+PROBE: SIGNIFICANT CHANGE UP
SARS-COV-2 RNA SPEC QL NAA+PROBE: SIGNIFICANT CHANGE UP

## 2020-12-16 PROCEDURE — 99284 EMERGENCY DEPT VISIT MOD MDM: CPT

## 2020-12-16 PROCEDURE — 87651 STREP A DNA AMP PROBE: CPT

## 2020-12-16 PROCEDURE — 99283 EMERGENCY DEPT VISIT LOW MDM: CPT

## 2020-12-16 PROCEDURE — U0003: CPT

## 2020-12-16 PROCEDURE — 87798 DETECT AGENT NOS DNA AMP: CPT

## 2020-12-16 NOTE — ED PROVIDER NOTE - OBJECTIVE STATEMENT
Pt is a 29 y/o m, denies PMH, presents to ED requesting COVID-19 testing. Pt states he was exposed to a person who tested + for covid-19 3 days ago and he would like to be tested at this time. Pt currently c/o of a sore throat. Pt has not taken any medication in attempt to alleviate symptoms. Pt tolerating PO intake / secretions and has bene w/o vomiting since onset of symptoms. Pt has no other complaints at this time. Denies fevers, chills, SOB, HA, dizziness, nausea, vomiting, belly pain, recent travel.

## 2020-12-16 NOTE — ED PROVIDER NOTE - NSFOLLOWUPINSTRUCTIONS_ED_ALL_ED_FT
- Return to the ED for any new or worsening symptoms including but not limited to difficulty breathing, severe weakness, confusion, etc.  - Take Tylenol (Acetaminophen) 650mg as needed for pain or fever  - Stay well hydrated.   - Avoid contact with anybody who is sick OR at risk populations including elderly, young, pregnant patients, immune compromised people  - Wash hands frequently in warm soapy water for at least 20 seconds   - Quarantine yourself at home for at least 2 weeks      Viral Respiratory Infection    A viral respiratory infection is an illness that affects parts of the body used for breathing, like the lungs, nose, and throat. It is caused by a germ called a virus. Symptoms can include runny nose, coughing, sneezing, fatigue, body aches, sore throat, fever, or headache. Over the counter medicine can be used to manage the symptoms but the infection typically goes away on its own in 5 to 10 days.     SEEK IMMEDIATE MEDICAL CARE IF YOU HAVE ANY OF THE FOLLOWING SYMPTOMS: shortness of breath, chest pain, fever over 10 days, or lightheadedness/dizziness.

## 2020-12-16 NOTE — ED PROVIDER NOTE - PATIENT PORTAL LINK FT
You can access the FollowMyHealth Patient Portal offered by [facility] by registering at the following website: [email or web address]. By joining [employer]’s FollowMyHealth portal, you will also be able to view your health information using other applications (apps) compatible with our system.

## 2020-12-16 NOTE — ED PROVIDER NOTE - CLINICAL SUMMARY MEDICAL DECISION MAKING FREE TEXT BOX
30m with sore throat x 3 days requesting COVID-19 testing. Throat clear, no erythema / exudates. VSS. COVID-19 and Strep PCR sent. Pt given excuse from work form and given strict return precautions.

## 2020-12-27 ENCOUNTER — EMERGENCY (EMERGENCY)
Facility: HOSPITAL | Age: 30
LOS: 1 days | Discharge: HOME | End: 2020-12-27
Payer: COMMERCIAL

## 2020-12-27 VITALS
DIASTOLIC BLOOD PRESSURE: 89 MMHG | HEIGHT: 68 IN | TEMPERATURE: 98 F | SYSTOLIC BLOOD PRESSURE: 148 MMHG | RESPIRATION RATE: 18 BRPM | WEIGHT: 199.96 LBS | HEART RATE: 83 BPM | OXYGEN SATURATION: 99 %

## 2020-12-27 LAB — SARS-COV-2 RNA SPEC QL NAA+PROBE: SIGNIFICANT CHANGE UP

## 2020-12-27 PROCEDURE — U0003: CPT

## 2020-12-27 PROCEDURE — 99283 EMERGENCY DEPT VISIT LOW MDM: CPT

## 2020-12-27 NOTE — ED ADULT TRIAGE NOTE - CHIEF COMPLAINT QUOTE
Pt requesting Covid test. His parents that he lives with both tested positive yesterday. Pt is asymptomatic.

## 2020-12-27 NOTE — ED PROVIDER NOTE - CLINICAL SUMMARY MEDICAL DECISION MAKING FREE TEXT BOX
will send covid testing     PT COVID (and/or RVP) swab(s) obtained.  Advised home quarantine until test results available.  If test positive, requires home quarantine. Anticipatory guidance provided and supportive care recommended. Advised immediate return if worsening symptoms, including and not limited to sever chest pain, worsening shortness of breath, fever not reduced with OTC antipyretic use, inability to tolerate food or liquid.

## 2020-12-27 NOTE — ED PROVIDER NOTE - OBJECTIVE STATEMENT
31 y/o male presents for COVID. He had a exposure to his parents who are positive for COVId. He is asymptomatic

## 2020-12-27 NOTE — ED PROVIDER NOTE - PATIENT PORTAL LINK FT
You can access the FollowMyHealth Patient Portal offered by [facility] by registering at the following website: [email or web address]. By joining [employer]’s FollowMyHealth portal, you will also be able to view your health information using other applications (apps) compatible with our system.

## 2021-01-03 ENCOUNTER — EMERGENCY (EMERGENCY)
Facility: HOSPITAL | Age: 31
LOS: 1 days | Discharge: HOME | End: 2021-01-03
Payer: COMMERCIAL

## 2021-01-03 VITALS
DIASTOLIC BLOOD PRESSURE: 89 MMHG | SYSTOLIC BLOOD PRESSURE: 147 MMHG | TEMPERATURE: 98 F | OXYGEN SATURATION: 100 % | RESPIRATION RATE: 16 BRPM | HEART RATE: 74 BPM

## 2021-01-03 VITALS — HEIGHT: 68 IN

## 2021-01-03 LAB — SARS-COV-2 RNA SPEC QL NAA+PROBE: SIGNIFICANT CHANGE UP

## 2021-01-03 PROCEDURE — 99283 EMERGENCY DEPT VISIT LOW MDM: CPT

## 2021-01-03 PROCEDURE — U0005: CPT

## 2021-01-03 PROCEDURE — U0003: CPT

## 2021-01-05 NOTE — ED PROVIDER NOTE - CLINICAL SUMMARY MEDICAL DECISION MAKING FREE TEXT BOX
PT COVID (and/or RVP) swab(s) obtained.  Advised home quarantine until test results available.  If test positive, requires home quarantine. Anticipatory guidance provided and supportive care recommended. Advised immediate return if worsening symptoms, including and not limited to sever chest pain, worsening shortness of breath, fever not reduced with OTC antipyretic use, inability to tolerate food or liquid. PT provided with written COVID discharge instructions and instructions on how to obtain COVID results. Advised to contact PMD within 24 hoiurs.

## 2021-01-05 NOTE — ED PROVIDER NOTE - PATIENT PORTAL LINK FT
You can access the FollowMyHealth Patient Portal offered by [facility] by registering at the following website: [email or web address]. By joining [employer]’s FollowMyHealth portal, you will also be able to view your health information using other applications (apps) compatible with our system.

## 2021-01-08 ENCOUNTER — EMERGENCY (EMERGENCY)
Facility: HOSPITAL | Age: 31
LOS: 1 days | Discharge: HOME | End: 2021-01-08
Payer: COMMERCIAL

## 2021-01-08 ENCOUNTER — TRANSCRIPTION ENCOUNTER (OUTPATIENT)
Age: 31
End: 2021-01-08

## 2021-01-08 VITALS
TEMPERATURE: 98 F | DIASTOLIC BLOOD PRESSURE: 97 MMHG | SYSTOLIC BLOOD PRESSURE: 156 MMHG | RESPIRATION RATE: 18 BRPM | HEART RATE: 100 BPM | HEIGHT: 68 IN | OXYGEN SATURATION: 100 %

## 2021-01-08 LAB — SARS-COV-2 RNA SPEC QL NAA+PROBE: DETECTED

## 2021-01-08 PROCEDURE — 99283 EMERGENCY DEPT VISIT LOW MDM: CPT

## 2021-01-08 PROCEDURE — U0003: CPT

## 2021-01-08 PROCEDURE — 99284 EMERGENCY DEPT VISIT MOD MDM: CPT

## 2021-01-08 PROCEDURE — U0005: CPT

## 2021-01-16 NOTE — ED PROVIDER NOTE - PATIENT PORTAL LINK FT
You can access the FollowMyHealth Patient Portal offered by [facility] by registering at the following website: [email or web address]. By joining [employer]’s FollowMyHealth portal, you will also be able to view your health information using other applications (apps) compatible with our system.

## 2021-12-27 ENCOUNTER — EMERGENCY (EMERGENCY)
Facility: HOSPITAL | Age: 31
LOS: 1 days | Discharge: HOME | End: 2021-12-27
Payer: COMMERCIAL

## 2021-12-27 VITALS
DIASTOLIC BLOOD PRESSURE: 98 MMHG | OXYGEN SATURATION: 100 % | HEART RATE: 62 BPM | TEMPERATURE: 98 F | WEIGHT: 205.91 LBS | SYSTOLIC BLOOD PRESSURE: 154 MMHG | HEIGHT: 68 IN | RESPIRATION RATE: 18 BRPM

## 2021-12-27 LAB — SARS-COV-2 RNA SPEC QL NAA+PROBE: DETECTED

## 2021-12-27 PROCEDURE — U0003: CPT

## 2021-12-27 PROCEDURE — U0005: CPT

## 2021-12-27 PROCEDURE — 99282 EMERGENCY DEPT VISIT SF MDM: CPT

## 2021-12-27 PROCEDURE — 99283 EMERGENCY DEPT VISIT LOW MDM: CPT

## 2021-12-27 NOTE — ED PROVIDER NOTE - PATIENT PORTAL LINK FT
You can access the FollowMyHealth Patient Portal offered by [facility] by registering at the following website: [email or web address]. By joining [employer]’s FollowMyHealth portal, you will also be able to view your health information using other applications (apps) compatible with our system.

## 2021-12-27 NOTE — ED PROVIDER NOTE - CLINICAL SUMMARY MEDICAL DECISION MAKING FREE TEXT BOX
Pt nontoxic appearing, stable vitals, ambulatory with stable saturation without supplemental oxygen. PT does not meet criteria listed in most updated guidelines as per [facility] protocol/algorithm for admission at this time. pt advised about self-quarantine instructions until negative test results and/or symptom resolution. pt advised on hand hygiene, monitoring of symptoms, antipyretic use as well as and fu with primary care provider. Instructions given in pre-printed copy.

## 2021-12-27 NOTE — ED PROVIDER NOTE - OBJECTIVE STATEMENT
32 y/o M presents to ED for COVID testing. Pt currently asymptomatic. No chest pain, sob, cough, abd pain, n/v/d, headache, dizziness. + sick contacts. No recent travel. Pt well appearing. NAD.

## 2021-12-27 NOTE — ED PROVIDER NOTE - PHYSICAL EXAMINATION
general: Well appearing, awake, alert, oriented to person, place, time/situation and in no apparent distress.  ENT: Airway patent, Nasal mucosa clear.  Eye: Clear bilaterally,  Cardiac: Normal rate,  Respiratory: no respiratory distress  neuro: Alert and oriented,

## 2022-01-16 ENCOUNTER — TRANSCRIPTION ENCOUNTER (OUTPATIENT)
Age: 32
End: 2022-01-16

## 2022-03-26 ENCOUNTER — TRANSCRIPTION ENCOUNTER (OUTPATIENT)
Age: 32
End: 2022-03-26

## 2022-09-20 ENCOUNTER — EMERGENCY (EMERGENCY)
Facility: HOSPITAL | Age: 32
LOS: 1 days | Discharge: LEFT BEFORE DISCHARGE | End: 2022-09-20

## 2022-09-20 VITALS
RESPIRATION RATE: 18 BRPM | OXYGEN SATURATION: 100 % | HEIGHT: 68 IN | HEART RATE: 71 BPM | WEIGHT: 219.58 LBS | TEMPERATURE: 98 F | DIASTOLIC BLOOD PRESSURE: 95 MMHG | SYSTOLIC BLOOD PRESSURE: 155 MMHG

## 2022-09-20 PROCEDURE — L9991: CPT

## 2022-09-20 NOTE — ED ADULT NURSE NOTE - CHIEF COMPLAINT QUOTE
Pt presents to ED for eval of right sided facial numbness and paralysis. Pt states hat he has had the symptoms since Sunday am aftr waking up. C/o facial numbness and paralysis of right side of face unable to blink or move eyebrow and lips. States had COVID 1 week ago.

## 2022-09-20 NOTE — ED ADULT TRIAGE NOTE - CHIEF COMPLAINT QUOTE
Pt presents to ED for eval of right sided facial numbness and paralysis. Pt states hat he has had the symptoms since Sunday am aftr waking up. C/o facial numbness and paralysis of right side of face unable to blink or move eyebrow and lips. Pt presents to ED for eval of right sided facial numbness and paralysis. Pt states hat he has had the symptoms since Sunday am aftr waking up. C/o facial numbness and paralysis of right side of face unable to blink or move eyebrow and lips. States had COVID 1 week ago.

## 2022-10-18 NOTE — ED PROVIDER NOTE - PRO INTERPRETER NEED 2
English Xolair Counseling:  Patient informed of potential adverse effects including but not limited to fever, muscle aches, rash and allergic reactions.  The patient verbalized understanding of the proper use and possible adverse effects of Xolair.  All of the patient's questions and concerns were addressed.

## 2022-12-28 ENCOUNTER — INPATIENT (INPATIENT)
Facility: HOSPITAL | Age: 32
LOS: 0 days | Discharge: LEFT BEFORE DISCHARGE | DRG: 66 | End: 2022-12-29
Attending: STUDENT IN AN ORGANIZED HEALTH CARE EDUCATION/TRAINING PROGRAM | Admitting: INTERNAL MEDICINE
Payer: COMMERCIAL

## 2022-12-28 VITALS
TEMPERATURE: 98 F | DIASTOLIC BLOOD PRESSURE: 102 MMHG | WEIGHT: 222.01 LBS | RESPIRATION RATE: 20 BRPM | HEART RATE: 88 BPM | OXYGEN SATURATION: 98 % | SYSTOLIC BLOOD PRESSURE: 156 MMHG

## 2022-12-28 DIAGNOSIS — I63.9 CEREBRAL INFARCTION, UNSPECIFIED: ICD-10-CM

## 2022-12-28 LAB
A1C WITH ESTIMATED AVERAGE GLUCOSE RESULT: 5.5 % — SIGNIFICANT CHANGE UP (ref 4–5.6)
ALBUMIN SERPL ELPH-MCNC: 4.3 G/DL — SIGNIFICANT CHANGE UP (ref 3.3–5.2)
ALP SERPL-CCNC: 79 U/L — SIGNIFICANT CHANGE UP (ref 40–120)
ALT FLD-CCNC: 44 U/L — HIGH
ANION GAP SERPL CALC-SCNC: 9 MMOL/L — SIGNIFICANT CHANGE UP (ref 5–17)
AST SERPL-CCNC: 56 U/L — HIGH
BASOPHILS # BLD AUTO: 0.03 K/UL — SIGNIFICANT CHANGE UP (ref 0–0.2)
BASOPHILS NFR BLD AUTO: 0.5 % — SIGNIFICANT CHANGE UP (ref 0–2)
BILIRUB SERPL-MCNC: 0.7 MG/DL — SIGNIFICANT CHANGE UP (ref 0.4–2)
BUN SERPL-MCNC: 15 MG/DL — SIGNIFICANT CHANGE UP (ref 8–20)
CALCIUM SERPL-MCNC: 9.3 MG/DL — SIGNIFICANT CHANGE UP (ref 8.4–10.5)
CHLORIDE SERPL-SCNC: 100 MMOL/L — SIGNIFICANT CHANGE UP (ref 96–108)
CO2 SERPL-SCNC: 28 MMOL/L — SIGNIFICANT CHANGE UP (ref 22–29)
CREAT SERPL-MCNC: 1.03 MG/DL — SIGNIFICANT CHANGE UP (ref 0.5–1.3)
EGFR: 100 ML/MIN/1.73M2 — SIGNIFICANT CHANGE UP
EOSINOPHIL # BLD AUTO: 0.03 K/UL — SIGNIFICANT CHANGE UP (ref 0–0.5)
EOSINOPHIL NFR BLD AUTO: 0.5 % — SIGNIFICANT CHANGE UP (ref 0–6)
ESTIMATED AVERAGE GLUCOSE: 111 MG/DL — SIGNIFICANT CHANGE UP (ref 68–114)
GLUCOSE SERPL-MCNC: 92 MG/DL — SIGNIFICANT CHANGE UP (ref 70–99)
HCT VFR BLD CALC: 46.2 % — SIGNIFICANT CHANGE UP (ref 39–50)
HGB BLD-MCNC: 15.5 G/DL — SIGNIFICANT CHANGE UP (ref 13–17)
IMM GRANULOCYTES NFR BLD AUTO: 0.3 % — SIGNIFICANT CHANGE UP (ref 0–0.9)
LIDOCAIN IGE QN: 20 U/L — LOW (ref 22–51)
LYMPHOCYTES # BLD AUTO: 1.97 K/UL — SIGNIFICANT CHANGE UP (ref 1–3.3)
LYMPHOCYTES # BLD AUTO: 30.3 % — SIGNIFICANT CHANGE UP (ref 13–44)
MCHC RBC-ENTMCNC: 29.8 PG — SIGNIFICANT CHANGE UP (ref 27–34)
MCHC RBC-ENTMCNC: 33.5 GM/DL — SIGNIFICANT CHANGE UP (ref 32–36)
MCV RBC AUTO: 88.8 FL — SIGNIFICANT CHANGE UP (ref 80–100)
MONOCYTES # BLD AUTO: 0.69 K/UL — SIGNIFICANT CHANGE UP (ref 0–0.9)
MONOCYTES NFR BLD AUTO: 10.6 % — SIGNIFICANT CHANGE UP (ref 2–14)
NEUTROPHILS # BLD AUTO: 3.77 K/UL — SIGNIFICANT CHANGE UP (ref 1.8–7.4)
NEUTROPHILS NFR BLD AUTO: 57.8 % — SIGNIFICANT CHANGE UP (ref 43–77)
PLATELET # BLD AUTO: 277 K/UL — SIGNIFICANT CHANGE UP (ref 150–400)
POTASSIUM SERPL-MCNC: 4.4 MMOL/L — SIGNIFICANT CHANGE UP (ref 3.5–5.3)
POTASSIUM SERPL-SCNC: 4.4 MMOL/L — SIGNIFICANT CHANGE UP (ref 3.5–5.3)
PROT SERPL-MCNC: 7.4 G/DL — SIGNIFICANT CHANGE UP (ref 6.6–8.7)
RBC # BLD: 5.2 M/UL — SIGNIFICANT CHANGE UP (ref 4.2–5.8)
RBC # FLD: 13.3 % — SIGNIFICANT CHANGE UP (ref 10.3–14.5)
SODIUM SERPL-SCNC: 137 MMOL/L — SIGNIFICANT CHANGE UP (ref 135–145)
TROPONIN T SERPL-MCNC: <0.01 NG/ML — SIGNIFICANT CHANGE UP (ref 0–0.06)
WBC # BLD: 6.51 K/UL — SIGNIFICANT CHANGE UP (ref 3.8–10.5)
WBC # FLD AUTO: 6.51 K/UL — SIGNIFICANT CHANGE UP (ref 3.8–10.5)

## 2022-12-28 PROCEDURE — 70450 CT HEAD/BRAIN W/O DYE: CPT | Mod: 26,ME

## 2022-12-28 PROCEDURE — 93010 ELECTROCARDIOGRAM REPORT: CPT

## 2022-12-28 PROCEDURE — 71045 X-RAY EXAM CHEST 1 VIEW: CPT | Mod: 26

## 2022-12-28 PROCEDURE — 99285 EMERGENCY DEPT VISIT HI MDM: CPT

## 2022-12-28 PROCEDURE — G1004: CPT

## 2022-12-28 RX ORDER — SODIUM CHLORIDE 9 MG/ML
1000 INJECTION, SOLUTION INTRAVENOUS ONCE
Refills: 0 | Status: COMPLETED | OUTPATIENT
Start: 2022-12-28 | End: 2022-12-28

## 2022-12-28 RX ORDER — ONDANSETRON 8 MG/1
4 TABLET, FILM COATED ORAL ONCE
Refills: 0 | Status: COMPLETED | OUTPATIENT
Start: 2022-12-28 | End: 2022-12-28

## 2022-12-28 RX ADMIN — SODIUM CHLORIDE 1000 MILLILITER(S): 9 INJECTION, SOLUTION INTRAVENOUS at 19:10

## 2022-12-28 RX ADMIN — SODIUM CHLORIDE 1000 MILLILITER(S): 9 INJECTION, SOLUTION INTRAVENOUS at 18:35

## 2022-12-28 RX ADMIN — ONDANSETRON 4 MILLIGRAM(S): 8 TABLET, FILM COATED ORAL at 18:36

## 2022-12-28 NOTE — ED ADULT TRIAGE NOTE - CHIEF COMPLAINT QUOTE
Patient presents to ER C/O chest discomfort and dizziness, reports low Hg, no PMH, resp even/unlabored.

## 2022-12-28 NOTE — ED ADULT NURSE REASSESSMENT NOTE - NS ED NURSE REASSESS COMMENT FT1
assumed pt care at 1930 - pt denies any pain at this time. gcs15 breathing even and unlabored on room air.

## 2022-12-28 NOTE — ED ADULT NURSE NOTE - OBJECTIVE STATEMENT
Pt is a 32YOM who is here for pain in his epigastric/chest area. Pt states that around 2pm, he started to feel a sharp, stabbing pain in his epigastric area that lasted about 10-20 minutes, pt denies any shortness of breath, but states it was difficult to breathe, pt denies any fevers, chills, nausea or vomiting, pt states that he drinks 4 of 7 nights a week, denies changes in his bowel or elimination patterns, pt is A&O4. Pt has a cardiac monitor in place.

## 2022-12-28 NOTE — ED PROVIDER NOTE - OBJECTIVE STATEMENT
32 year old male with no significant pMh presents with abd pain. Pt states that earlier today, he developed a sharp epigastric abd pain that radiated into his chest. It lasted 12 minutes, resolvd. not related to exertion or deep inspiration. No SOB, chest pain, but he reports feeling nausea and lightheadedness with it. no diarrhea, vomiting, dysuria.  Of note, pt also reports that for the past 2.5 weeks he is having intermittent episodes in which he feels off balance. it is not related to positional/postural; changes, no blurry vision, headache, extremity weakness, slurred speech.

## 2022-12-28 NOTE — ED PROVIDER NOTE - CLINICAL SUMMARY MEDICAL DECISION MAKING FREE TEXT BOX
pt arrived with over 2 weeks of ataxia and findings concerning for cerebellar infract, admitted to medicine for stroke eval

## 2022-12-29 ENCOUNTER — TRANSCRIPTION ENCOUNTER (OUTPATIENT)
Age: 32
End: 2022-12-29

## 2022-12-29 VITALS
TEMPERATURE: 99 F | SYSTOLIC BLOOD PRESSURE: 115 MMHG | DIASTOLIC BLOOD PRESSURE: 71 MMHG | HEART RATE: 79 BPM | OXYGEN SATURATION: 99 % | RESPIRATION RATE: 18 BRPM

## 2022-12-29 LAB
A1C WITH ESTIMATED AVERAGE GLUCOSE RESULT: 5.5 % — SIGNIFICANT CHANGE UP (ref 4–5.6)
ALBUMIN SERPL ELPH-MCNC: 3.6 G/DL — SIGNIFICANT CHANGE UP (ref 3.3–5.2)
ALP SERPL-CCNC: 71 U/L — SIGNIFICANT CHANGE UP (ref 40–120)
ALT FLD-CCNC: 33 U/L — SIGNIFICANT CHANGE UP
ANION GAP SERPL CALC-SCNC: 10 MMOL/L — SIGNIFICANT CHANGE UP (ref 5–17)
AST SERPL-CCNC: 27 U/L — SIGNIFICANT CHANGE UP
BASOPHILS # BLD AUTO: 0.03 K/UL — SIGNIFICANT CHANGE UP (ref 0–0.2)
BASOPHILS NFR BLD AUTO: 0.5 % — SIGNIFICANT CHANGE UP (ref 0–2)
BILIRUB SERPL-MCNC: 1 MG/DL — SIGNIFICANT CHANGE UP (ref 0.4–2)
BUN SERPL-MCNC: 11 MG/DL — SIGNIFICANT CHANGE UP (ref 8–20)
CALCIUM SERPL-MCNC: 8.8 MG/DL — SIGNIFICANT CHANGE UP (ref 8.4–10.5)
CHLORIDE SERPL-SCNC: 101 MMOL/L — SIGNIFICANT CHANGE UP (ref 96–108)
CHOLEST SERPL-MCNC: 173 MG/DL — SIGNIFICANT CHANGE UP
CO2 SERPL-SCNC: 26 MMOL/L — SIGNIFICANT CHANGE UP (ref 22–29)
CREAT SERPL-MCNC: 0.99 MG/DL — SIGNIFICANT CHANGE UP (ref 0.5–1.3)
EGFR: 105 ML/MIN/1.73M2 — SIGNIFICANT CHANGE UP
EOSINOPHIL # BLD AUTO: 0.03 K/UL — SIGNIFICANT CHANGE UP (ref 0–0.5)
EOSINOPHIL NFR BLD AUTO: 0.5 % — SIGNIFICANT CHANGE UP (ref 0–6)
ESTIMATED AVERAGE GLUCOSE: 111 MG/DL — SIGNIFICANT CHANGE UP (ref 68–114)
GLUCOSE SERPL-MCNC: 96 MG/DL — SIGNIFICANT CHANGE UP (ref 70–99)
HCT VFR BLD CALC: 42.1 % — SIGNIFICANT CHANGE UP (ref 39–50)
HDLC SERPL-MCNC: 54 MG/DL — SIGNIFICANT CHANGE UP
HGB BLD-MCNC: 14.1 G/DL — SIGNIFICANT CHANGE UP (ref 13–17)
IMM GRANULOCYTES NFR BLD AUTO: 0.3 % — SIGNIFICANT CHANGE UP (ref 0–0.9)
LIPID PNL WITH DIRECT LDL SERPL: 107 MG/DL — HIGH
LYMPHOCYTES # BLD AUTO: 1.99 K/UL — SIGNIFICANT CHANGE UP (ref 1–3.3)
LYMPHOCYTES # BLD AUTO: 31.1 % — SIGNIFICANT CHANGE UP (ref 13–44)
MAGNESIUM SERPL-MCNC: 2 MG/DL — SIGNIFICANT CHANGE UP (ref 1.8–2.6)
MCHC RBC-ENTMCNC: 29.7 PG — SIGNIFICANT CHANGE UP (ref 27–34)
MCHC RBC-ENTMCNC: 33.5 GM/DL — SIGNIFICANT CHANGE UP (ref 32–36)
MCV RBC AUTO: 88.8 FL — SIGNIFICANT CHANGE UP (ref 80–100)
MONOCYTES # BLD AUTO: 0.66 K/UL — SIGNIFICANT CHANGE UP (ref 0–0.9)
MONOCYTES NFR BLD AUTO: 10.3 % — SIGNIFICANT CHANGE UP (ref 2–14)
NEUTROPHILS # BLD AUTO: 3.66 K/UL — SIGNIFICANT CHANGE UP (ref 1.8–7.4)
NEUTROPHILS NFR BLD AUTO: 57.3 % — SIGNIFICANT CHANGE UP (ref 43–77)
NON HDL CHOLESTEROL: 119 MG/DL — SIGNIFICANT CHANGE UP
PHOSPHATE SERPL-MCNC: 3.5 MG/DL — SIGNIFICANT CHANGE UP (ref 2.4–4.7)
PLATELET # BLD AUTO: 252 K/UL — SIGNIFICANT CHANGE UP (ref 150–400)
POTASSIUM SERPL-MCNC: 4.1 MMOL/L — SIGNIFICANT CHANGE UP (ref 3.5–5.3)
POTASSIUM SERPL-SCNC: 4.1 MMOL/L — SIGNIFICANT CHANGE UP (ref 3.5–5.3)
PROT SERPL-MCNC: 6.3 G/DL — LOW (ref 6.6–8.7)
RAPID RVP RESULT: SIGNIFICANT CHANGE UP
RBC # BLD: 4.74 M/UL — SIGNIFICANT CHANGE UP (ref 4.2–5.8)
RBC # FLD: 13.4 % — SIGNIFICANT CHANGE UP (ref 10.3–14.5)
SARS-COV-2 RNA SPEC QL NAA+PROBE: SIGNIFICANT CHANGE UP
SODIUM SERPL-SCNC: 137 MMOL/L — SIGNIFICANT CHANGE UP (ref 135–145)
TRIGL SERPL-MCNC: 60 MG/DL — SIGNIFICANT CHANGE UP
TROPONIN T SERPL-MCNC: <0.01 NG/ML — SIGNIFICANT CHANGE UP (ref 0–0.06)
TSH SERPL-MCNC: 2.11 UIU/ML — SIGNIFICANT CHANGE UP (ref 0.27–4.2)
WBC # BLD: 6.39 K/UL — SIGNIFICANT CHANGE UP (ref 3.8–10.5)
WBC # FLD AUTO: 6.39 K/UL — SIGNIFICANT CHANGE UP (ref 3.8–10.5)

## 2022-12-29 PROCEDURE — 96374 THER/PROPH/DIAG INJ IV PUSH: CPT

## 2022-12-29 PROCEDURE — 83036 HEMOGLOBIN GLYCOSYLATED A1C: CPT

## 2022-12-29 PROCEDURE — 83690 ASSAY OF LIPASE: CPT

## 2022-12-29 PROCEDURE — 80053 COMPREHEN METABOLIC PANEL: CPT

## 2022-12-29 PROCEDURE — 93010 ELECTROCARDIOGRAM REPORT: CPT

## 2022-12-29 PROCEDURE — 84100 ASSAY OF PHOSPHORUS: CPT

## 2022-12-29 PROCEDURE — 99285 EMERGENCY DEPT VISIT HI MDM: CPT

## 2022-12-29 PROCEDURE — 84484 ASSAY OF TROPONIN QUANT: CPT

## 2022-12-29 PROCEDURE — 80061 LIPID PANEL: CPT

## 2022-12-29 PROCEDURE — C8929: CPT

## 2022-12-29 PROCEDURE — 70498 CT ANGIOGRAPHY NECK: CPT | Mod: MA

## 2022-12-29 PROCEDURE — 85025 COMPLETE CBC W/AUTO DIFF WBC: CPT

## 2022-12-29 PROCEDURE — 71045 X-RAY EXAM CHEST 1 VIEW: CPT

## 2022-12-29 PROCEDURE — 70496 CT ANGIOGRAPHY HEAD: CPT | Mod: 26

## 2022-12-29 PROCEDURE — 84443 ASSAY THYROID STIM HORMONE: CPT

## 2022-12-29 PROCEDURE — 99223 1ST HOSP IP/OBS HIGH 75: CPT

## 2022-12-29 PROCEDURE — 99222 1ST HOSP IP/OBS MODERATE 55: CPT

## 2022-12-29 PROCEDURE — 70450 CT HEAD/BRAIN W/O DYE: CPT | Mod: ME

## 2022-12-29 PROCEDURE — 99233 SBSQ HOSP IP/OBS HIGH 50: CPT

## 2022-12-29 PROCEDURE — 0225U NFCT DS DNA&RNA 21 SARSCOV2: CPT

## 2022-12-29 PROCEDURE — 93005 ELECTROCARDIOGRAM TRACING: CPT

## 2022-12-29 PROCEDURE — 36415 COLL VENOUS BLD VENIPUNCTURE: CPT

## 2022-12-29 PROCEDURE — 83735 ASSAY OF MAGNESIUM: CPT

## 2022-12-29 PROCEDURE — G1004: CPT

## 2022-12-29 PROCEDURE — 70498 CT ANGIOGRAPHY NECK: CPT | Mod: 26

## 2022-12-29 PROCEDURE — 70496 CT ANGIOGRAPHY HEAD: CPT | Mod: MA

## 2022-12-29 RX ORDER — LANOLIN ALCOHOL/MO/W.PET/CERES
3 CREAM (GRAM) TOPICAL AT BEDTIME
Refills: 0 | Status: DISCONTINUED | OUTPATIENT
Start: 2022-12-29 | End: 2022-12-29

## 2022-12-29 RX ORDER — ATORVASTATIN CALCIUM 80 MG/1
1 TABLET, FILM COATED ORAL
Qty: 30 | Refills: 1
Start: 2022-12-29 | End: 2023-02-26

## 2022-12-29 RX ORDER — ENOXAPARIN SODIUM 100 MG/ML
40 INJECTION SUBCUTANEOUS EVERY 24 HOURS
Refills: 0 | Status: DISCONTINUED | OUTPATIENT
Start: 2022-12-29 | End: 2022-12-29

## 2022-12-29 RX ORDER — ONDANSETRON 8 MG/1
4 TABLET, FILM COATED ORAL EVERY 8 HOURS
Refills: 0 | Status: DISCONTINUED | OUTPATIENT
Start: 2022-12-29 | End: 2022-12-29

## 2022-12-29 RX ORDER — CLOPIDOGREL BISULFATE 75 MG/1
1 TABLET, FILM COATED ORAL
Qty: 30 | Refills: 2
Start: 2022-12-29 | End: 2023-03-28

## 2022-12-29 RX ORDER — ACETAMINOPHEN 500 MG
650 TABLET ORAL EVERY 6 HOURS
Refills: 0 | Status: DISCONTINUED | OUTPATIENT
Start: 2022-12-29 | End: 2022-12-29

## 2022-12-29 RX ORDER — ATORVASTATIN CALCIUM 80 MG/1
80 TABLET, FILM COATED ORAL AT BEDTIME
Refills: 0 | Status: DISCONTINUED | OUTPATIENT
Start: 2022-12-29 | End: 2022-12-29

## 2022-12-29 RX ADMIN — ENOXAPARIN SODIUM 40 MILLIGRAM(S): 100 INJECTION SUBCUTANEOUS at 05:19

## 2022-12-29 NOTE — PROGRESS NOTE ADULT - ASSESSMENT
32M with no PMHX presents to [facility] ER c/o dizziness and difficulty walking which began 2.5 weeks ago concerning for Ataxia admitted for Acute CVA.     #Ataxia 2/2 Acute CVA r/o Sinus Thrombosis  -Admit to Tele  -Out of window for TPA  -CTH +R Cerebellar CVA  -CTA Head/Neck without stenosis/occlusion  -MRI Brain pending   -MRV Brain pending r/o Sinus Thrombosis  -TTE done, pending read  -FLP normal, A1C 5.5  -Neurochecks q4  -ASA Allergy (Anaphylaxis)  -High Intensity Statin  -PT/OT/acute rehab eval - likely home  -VTE PPX LMWH  -Neurology Consulted by admitting provider, anupama recs     Recent COVID  -COVID positive 2 months ago. Repeat COVID19 pending  -No hypoxia hold Remdesivir/Dexamethasone for now    Dispo: pending neuro workup, 1-2 days

## 2022-12-29 NOTE — DISCHARGE NOTE PROVIDER - CARE PROVIDER_API CALL
Castillo Tran)  Neurology; Vascular Neurology  [address] [place] [id number]  Phone: [phone]  Fax: [phone]  Follow Up Time:

## 2022-12-29 NOTE — DISCHARGE NOTE PROVIDER - NSDCMRMEDTOKEN_GEN_ALL_CORE_FT
atorvastatin 80 mg oral tablet: 1 tab(s) orally once a day (at bedtime)  Plavix 75 mg oral tablet: 1 tab(s) orally once a day

## 2022-12-29 NOTE — H&P ADULT - NSHPLABSRESULTS_GEN_ALL_CORE
CTH WO IMPRESSION:  1. Increased attenuation appreciated along the straight sinus, superior   sagittal sinus, and right transverse sinus. Cannot exclude venous sinus   thrombosis. Consider follow-up CTV versus MRV assessment.  2. Findings are concerning for small right cerebellar infarct. Follow-up   MR imaging with DWI and ADC mapping may be considered, as clinically   indicated.  3. Opacification and expansion of a posteriorly located right-sided   ethmoid air cell. Cannot exclude a mucocele.

## 2022-12-29 NOTE — CONSULT NOTE ADULT - SUBJECTIVE AND OBJECTIVE BOX
32yM was admitted on 12-28    Patient is a 32y old  Male who presents with a chief complaint of Ataxia, CVA (29 Dec 2022 01:40)    HPI:  Mr. Calle is a 32 year old male with no PMHX who presents to [facility] ER c/o dizziness and difficulty walking which began 2.5 weeks ago.  Denies any weakness.  Reports he feels unstable sometimes on his feet.  CTH found to have a small R cerebellar infarct.  This morning he denies any weakness.  He denies any numbness or dizziness.      Imaging reviewed showed:  ACC: [id number] EXAM:  CT BRAIN                          PROCEDURE DATE:  12/28/2022          INTERPRETATION:  CT BRAIN WITHOUT CONTRAST    INDICATIONS:  Ataxia    TECHNIQUE:  Serial axial images were obtained from the skull base to the   vertex without the use of contrast.    COMPARISON EXAM: None.    FINDINGS:  Ventricles and sulci: Normal in size and configuration    Intra-axial: Small wedge-shaped focus of decreased attenuation within the   region of the right cerebellum (axial 9-10), which may represent an   infarct. No intracranial mass, acute hemorrhage, or midline shift is   present.  Extra-axial: No extra-axial fluid collection is identified. Increased   attenuation appreciated along the straight sinus, superior sagittal   sinus, and right transverse sinus. Cannot exclude venous sinus   thrombosis. Consider follow-up CTV versus MRV assessment.    Calvarium: Intact.    Bilateral optic globes are intact. Opacification and expansion of a   posteriorly located right-sided ethmoid air cell. Cannot exclude a   mucocele. Bilateral mastoid air cells and middle ear cavities are   predominantly clear.    IMPRESSION:  1. Increased attenuation appreciated along the straight sinus, superior   sagittal sinus, and right transverse sinus. Cannot exclude venous sinus   thrombosis. Consider follow-up CTV versus MRV assessment.  2. Findings are concerning for small right cerebellar infarct. Follow-up   MR imaging with DWI and ADC mapping may be considered, as clinically   indicated.  3. Opacification and expansion of a posteriorly located right-sided   ethmoid air cell. Cannot exclude a mucocele.      REVIEW OF SYSTEMS  Constitutional - No fever, No weight loss, No fatigue  HEENT - No eye pain, No visual disturbances, No difficulty hearing, No tinnitus, No vertigo, No neck pain  Respiratory - No cough, No wheezing, No shortness of breath  Cardiovascular - No chest pain, No palpitations  Gastrointestinal - No abdominal pain, No nausea, No vomiting, No diarrhea, No constipation  Genitourinary - No dysuria, No frequency, No hematuria, No incontinence  Neurological - No headaches, No memory loss, No loss of strength, No numbness, No tremors  Skin - No itching, No rashes, No lesions   Endocrine - No temperature intolerance  Musculoskeletal - No joint pain, No joint swelling, No muscle pain  Psychiatric - No depression, No anxiety    VITALS  T(C): 36.8 (12-29-22 @ 07:35), Max: 36.8 (12-29-22 @ 00:17)  HR: 88 (12-29-22 @ 07:35) (88 - 95)  BP: 142/78 (12-29-22 @ 07:35) (128/72 - 156/102)  RR: 18 (12-29-22 @ 07:35) (18 - 20)  SpO2: 98% (12-29-22 @ 07:35) (98% - 100%)  Wt(kg): --    PAST MEDICAL & SURGICAL HISTORY  No pertinent past medical history    No pertinent past medical history    No significant past surgical history    No significant past surgical history        SOCIAL HISTORY - as per documentation/history  Smoking - None  EtOH - None  Drugs - None    FUNCTIONAL HISTORY  Previously independent  Lives with mother and step Father.  2 steps to enter home.      CURRENT FUNCTIONAL STATUS  Pending evaluation.      FAMILY HISTORY   No pertinent family history in first degree relatives        RECENT LABS - Reviewed  CBC Full  -  ( 29 Dec 2022 02:37 )  WBC Count : 6.39 K/uL  RBC Count : 4.74 M/uL  Hemoglobin : 14.1 g/dL  Hematocrit : 42.1 %  Platelet Count - Automated : 252 K/uL  Mean Cell Volume : 88.8 fl  Mean Cell Hemoglobin : 29.7 pg  Mean Cell Hemoglobin Concentration : 33.5 gm/dL  Auto Neutrophil # : 3.66 K/uL  Auto Lymphocyte # : 1.99 K/uL  Auto Monocyte # : 0.66 K/uL  Auto Eosinophil # : 0.03 K/uL  Auto Basophil # : 0.03 K/uL  Auto Neutrophil % : 57.3 %  Auto Lymphocyte % : 31.1 %  Auto Monocyte % : 10.3 %  Auto Eosinophil % : 0.5 %  Auto Basophil % : 0.5 %    12-29    137  |  101  |  11.0  ----------------------------<  96  4.1   |  26.0  |  0.99    Ca    8.8      29 Dec 2022 02:37  Phos  3.5     12-29  Mg     2.0     12-29    TPro  6.3<L>  /  Alb  3.6  /  TBili  1.0  /  DBili  x   /  AST  27  /  ALT  33  /  AlkPhos  71  12-29        ALLERGIES  ASA-Free (Anaphylaxis)      MEDICATIONS   acetaminophen     Tablet .. 650 milliGRAM(s) Oral every 6 hours PRN  aluminum hydroxide/magnesium hydroxide/simethicone Suspension 30 milliLiter(s) Oral every 4 hours PRN  atorvastatin 80 milliGRAM(s) Oral at bedtime  enoxaparin Injectable 40 milliGRAM(s) SubCutaneous every 24 hours  melatonin 3 milliGRAM(s) Oral at bedtime PRN  ondansetron Injectable 4 milliGRAM(s) IV Push every 8 hours PRN      ----------------------------------------------------------------------------------------  PHYSICAL EXAM  Constitutional - NAD, Comfortable  HEENT - NCAT, EOMI  Neck - Supple, No limited ROM  Chest - Breathing comfortably, No wheezing  Cardiovascular - No cyanosis    Abdomen - Soft   Extremities - No C/C/E, No calf tenderness   Neurologic Exam -                    Cognitive - Awake, Alert, AAO to self, place, date, year, situation, able to calculate money      Communication - Fluent, No dysarthria     Cranial Nerves - CN 2-12 intact     Motor - No focal deficits                    LEFT    UE - ShAB 5/5, EF 5/5, EE 5/5, WE 5/5,  5/5                    RIGHT UE - ShAB 5/5, EF 5/5, EE 5/5, WE 5/5,  5/5                    LEFT    LE - HF 5/5, KE 5/5, DF 5/5, PF 5/5                    RIGHT LE - HF 5/5, KE 5/5, DF 5/5, PF 5/5        Sensory - Intact to LT     Reflexes - No clonus      Coordination - FTN intact  Psychiatric - Mood stable, Affect WNL  ----------------------------------------------------------------------------------------  ASSESSMENT/PLAN  32yMale with functional deficits after CVA  CVA - MRI pending, consult Neurology, atorvastatin  Pain - Tylenol  DVT PPX - SCDs, lovenox   Rehab - Consult PT/OT and SLP for evaluation.  Expect patient to achieve functional goals for DC HOME, pending therapy evaluations.  Rehabilitation team will continue to follow as patient's condition progresses.      Will continue to follow. Functional progress will determine ongoing rehab dispo recommendations, which may change.    Continue bedside therapy as well as OOB throughout the day with mobilization by staff to maintain cardiopulmonary function and prevention of secondary complications related to debility.

## 2022-12-29 NOTE — OCCUPATIONAL THERAPY INITIAL EVALUATION ADULT - ADDITIONAL COMMENTS
Pt has a shower stall with curtains and no grab bars. Pt owns no DME. Pt is right handed and drives. Pt independent for IADLs/shares responsibility with family.

## 2022-12-29 NOTE — CONSULT NOTE ADULT - SUBJECTIVE AND OBJECTIVE BOX
33 YO M presented to the ER yesterday initially complaining of mid sternal/epigastric pain then reported having episodes of feeling off balance when he is standing. He reports that his left leg gives in and he feels like he is about to fall, but he does not. No personal hx of medical issues in the past. Denies any focal weakness, numbness, visual changes, speech or language abnormalities.    PMH: none  PSH: not contributory  Social hx: , non smoker, social ETOH consumption  ROS: 10 systems were reviewed and are negative except whats in HPI    Exam:   ICU Vital Signs Last 24 Hrs  T(C): 37.2 (29 Dec 2022 11:05), Max: 37.2 (29 Dec 2022 11:05)  T(F): 98.9 (29 Dec 2022 11:05), Max: 98.9 (29 Dec 2022 11:05)  HR: 79 (29 Dec 2022 11:05) (79 - 95)  BP: 115/71 (29 Dec 2022 11:05) (115/71 - 156/102)  BP(mean): --  ABP: --  ABP(mean): --  RR: 18 (29 Dec 2022 11:05) (18 - 20)  SpO2: 99% (29 Dec 2022 11:05) (98% - 100%)    Awake, alert, oriented x3. Speech is fluent, language appropriate  RAUL, EOMI, face symmetric, facial sensation intact to LT, tongue midline  BLUEs and BLLEs 5/5 proximally and distally  Sensation intact to LT  Cerebellum: FTN intact BL  Gait: normal, able to walk on hills, toes, and is able to squat  Romberg -

## 2022-12-29 NOTE — DISCHARGE NOTE NURSING/CASE MANAGEMENT/SOCIAL WORK - PATIENT PORTAL LINK FT
You can access the FollowMyHealth Patient Portal offered by [facility] by registering at the following website: [email or web address]. By joining [employer]’s FollowMyHealth portal, you will also be able to view your health information using other applications (apps) compatible with our system.

## 2022-12-29 NOTE — OCCUPATIONAL THERAPY INITIAL EVALUATION ADULT - GENERAL OBSERVATIONS, REHAB EVAL
Received pt semifowler in bed, NAD, +IV lock, +Tele on RA A&Ox4. Pre/post pain level 0/10. Patient agreeable to OT evaluation

## 2022-12-29 NOTE — H&P ADULT - HISTORY OF PRESENT ILLNESS
32M with no PMHX presents to [facility] ER c/o dizziness and difficulty walking which began 2.5 weeks ago concerning for Ataxia. No weakness/facial droop. Ataxia resolved in ED. Out of window for TPA. CTH WO +Small R cerebellar infarct and possible sinus thrombosis. Also c/o Abd pain in ED which has also since resolved. +Recent COVID19 infection 2 months ago. No hypoxia noted. Repeat COVID pending. Pt seen/examined. Denies current CP/SOB. No other complaints.     ROS negative unless mentioned.    PMHX: None  PSHX: None  Fam Hx: None  Social Hx: Denies etoh/tobacco/drug abuse  Allergies: ASA (Anaphylaxis)

## 2022-12-29 NOTE — CHART NOTE - NSCHARTNOTEFT_GEN_A_CORE
Called by RN reporting pt requesting to sign out AMA.  Pt is A & O x 4.  He has spoken to Neurologist and understands risks but has children and needs to go home.  There is still wait to get an MRI Brain, which is recommended. He reports he will follow up with Neurologist as outpatient. Dr. Franco.

## 2022-12-29 NOTE — PROGRESS NOTE ADULT - SUBJECTIVE AND OBJECTIVE BOX
Yi Franco M.D.    Patient is a 32y old  Male who presents with a chief complaint of Ataxia, CVA (29 Dec 2022 09:04)      SUBJECTIVE / OVERNIGHT EVENTS: admitted overnight. Denies any concerns right now.    Patient denies chest pain, SOB, abd pain, N/V, fever, chills, dysuria or any other complaints. All remainder ROS negative.     MEDICATIONS  (STANDING):  atorvastatin 80 milliGRAM(s) Oral at bedtime  enoxaparin Injectable 40 milliGRAM(s) SubCutaneous every 24 hours    MEDICATIONS  (PRN):  acetaminophen     Tablet .. 650 milliGRAM(s) Oral every 6 hours PRN Temp greater or equal to 38C (100.4F), Mild Pain (1 - 3)  aluminum hydroxide/magnesium hydroxide/simethicone Suspension 30 milliLiter(s) Oral every 4 hours PRN Dyspepsia  melatonin 3 milliGRAM(s) Oral at bedtime PRN Insomnia  ondansetron Injectable 4 milliGRAM(s) IV Push every 8 hours PRN Nausea and/or Vomiting      I&O's Summary      PHYSICAL EXAM:  Vital Signs Last 24 Hrs  T(C): 37.2 (29 Dec 2022 11:05), Max: 37.2 (29 Dec 2022 11:05)  T(F): 98.9 (29 Dec 2022 11:05), Max: 98.9 (29 Dec 2022 11:05)  HR: 79 (29 Dec 2022 11:05) (79 - 95)  BP: 115/71 (29 Dec 2022 11:05) (115/71 - 156/102)  BP(mean): --  RR: 18 (29 Dec 2022 11:05) (18 - 20)  SpO2: 99% (29 Dec 2022 11:05) (98% - 100%)    Parameters below as of 29 Dec 2022 11:05  Patient On (Oxygen Delivery Method): room air      CONSTITUTIONAL: NAD, well-groomed  ENMT: Moist oral mucosa, no pharyngeal injection or exudates; normal dentition  RESPIRATORY: Normal respiratory effort; lungs are clear to auscultation bilaterally  CARDIOVASCULAR: Regular rate and rhythm, normal S1 and S2, no murmur/rub/gallop; No lower extremity edema  ABDOMEN: Nontender to palpation, normoactive bowel sounds  PSYCH: A+O x3; affect appropriate  NEUROLOGY: CN 2-12 are intact and symmetric; no gross sensory deficits;   SKIN: No rashes; no palpable lesions    LABS:                        14.1   6.39  )-----------( 252      ( 29 Dec 2022 02:37 )             42.1     12-29    137  |  101  |  11.0  ----------------------------<  96  4.1   |  26.0  |  0.99    Ca    8.8      29 Dec 2022 02:37  Phos  3.5     12-29  Mg     2.0     12-29    TPro  6.3<L>  /  Alb  3.6  /  TBili  1.0  /  DBili  x   /  AST  27  /  ALT  33  /  AlkPhos  71  12-29      CARDIAC MARKERS ( 29 Dec 2022 02:37 )  x     / <0.01 ng/mL / x     / x     / x      CARDIAC MARKERS ( 28 Dec 2022 18:20 )  x     / <0.01 ng/mL / x     / x     / x              CAPILLARY BLOOD GLUCOSE          RADIOLOGY & ADDITIONAL TESTS:  Results Reviewed:   Imaging Personally Reviewed:  Electrocardiogram Personally Reviewed:

## 2022-12-29 NOTE — H&P ADULT - NSHPPHYSICALEXAM_GEN_ALL_CORE
Vital Signs Last 24 Hrs  T(C): 36.8 (29 Dec 2022 00:17), Max: 36.8 (29 Dec 2022 00:17)  T(F): 98.2 (29 Dec 2022 00:17), Max: 98.2 (29 Dec 2022 00:17)  HR: 89 (29 Dec 2022 00:17) (88 - 89)  BP: 128/72 (29 Dec 2022 00:17) (128/72 - 156/102)  BP(mean): --  RR: 18 (29 Dec 2022 00:17) (18 - 20)  SpO2: 100% (29 Dec 2022 00:17) (98% - 100%)    Parameters below as of 29 Dec 2022 00:17  Patient On (Oxygen Delivery Method): room air    Constitutional: Well-appearing, NAD, VSS  Head: NC/AT  Eyes: PERRL, EOMI, anicteric sclera, conjunctiva WNL  ENT: Normal Pharynx, MMM, No tonsillar exudate/erythema  Neck: Supple, Non-tender  Chest: Non-tender, no rashes  Cardio: RRR, s1/s2, no appreciable murmurs/rubs/gallops  Resp: BS CTA bilaterally, no wheezing/rhonchi/rales  Abd: Soft, Non-tender, Non-distended, no rebound/guarding/rigidity  : not examined  Rectal: not examined  MSK: moving all extremities, no motor weakness, full ROM x4  Ext: palpable distal pulses, good capillary refill, no clubbing/cyanosis/edema  Psych: appropriate, cooperative  Neuro: CN II-XII grossly intact, no focal deficits, no facial droop, normal speech, normal gait (walked to bathroom)  Skin: Warm/Dry. No rashes.

## 2022-12-29 NOTE — CONSULT NOTE ADULT - ASSESSMENT
ASSESSMENT:  32M with no PMHX presents to [facility] ER c/o dizziness and intermittient LLE give in weakness which began 2.5 weeks ago - no focal neurological deficits appreciated on exam.  - CT head reviewed - no definitive abnormality appreciated my read), official read reports possible right cerebellar ischemic  - MRI brain pending  - MRV - CT report mentions possible SVT   if above tests are WNL then please d/c pt for outpatient general neurology follow up. If above tests do show acute/subacute pathology then neurology will follow along and will provide further guidance.

## 2022-12-29 NOTE — H&P ADULT - ASSESSMENT
ASSESSMENT:  32M with no PMHX presents to [facility] ER c/o dizziness and difficulty walking which began 2.5 weeks ago concerning for Ataxia admitted for Acute CVA.     PLAN:  Ataxia 2/2 Acute CVA r/o Sinus Thrombosis  -Admit to Tele  -Out of window for TPA  -CTH +R Cerebellar CVA  -CTA Head/Neck Pending  -MRI Brain pending   -MRV Brain pending r/o Sinus Thrombosis  -Check TTE  -EKG NSR 84bpm +LVH +NSSTTWC abnormal EKG -> repeat in AM  -FLP/A1C/TSH  -Neurochecks q4  -ASA Allergy (Anaphylaxis)  -High Intensity Statin  -PT/OT/Speech  -VTE PPX LMWH  -Neurology Consulted     Recent COVID  -COVID positive 2 months ago. Repeat COVID19 pending  -No hypoxia hold Remdesivir/Dexamethasone for now

## 2022-12-29 NOTE — OCCUPATIONAL THERAPY INITIAL EVALUATION ADULT - DIAGNOSIS, OT EVAL
32 year old Male  no PMHX presents to [facility] ER c/o dizziness and difficulty walking which began 2.5 weeks ago concerning for Ataxia admitted for Acute CVA.

## 2022-12-29 NOTE — OCCUPATIONAL THERAPY INITIAL EVALUATION ADULT - LIVES WITH, PROFILE
Pt lives in a house with his girlfriend, 2 kids (ages 4 and 2), mother, step father and sister, all who are able to assist. 2 HOWIE with hand rail, no steps inside to negotiate./children/other relative/parents

## 2022-12-29 NOTE — PHYSICAL THERAPY INITIAL EVALUATION ADULT - ADDITIONAL COMMENTS
Pt lives in a house with his girlfriend, 2 kids (ages 4 and 2), mother, step father and sister, all who are able to assist. 2 HOWIE with hand rail, no steps inside to negotiate.

## 2022-12-29 NOTE — DISCHARGE NOTE PROVIDER - HOSPITAL COURSE
32M with no PMHX presents to [facility] ER c/o dizziness and difficulty walking which began 2.5 weeks ago concerning for Ataxia admitted for Acute CVA. Ataxia 2/2 Acute CVA r/o Sinus Thrombosis, out of window for TPA, CTH +R Cerebellar CVA, pt then decided to AMA, see PA note. 32M with no PMHX presents to [facility] ER c/o dizziness and difficulty walking which began 2.5 weeks ago concerning for Ataxia admitted for Acute CVA. Ataxia 2/2 Acute CVA r/o Sinus Thrombosis, out of window for TPA, CTH +R Cerebellar CVA, pt then decided to AMA, see PA note. To give perscription for statin and plavix. Reports anaphylasix to ASA.

## 2023-04-28 ENCOUNTER — EMERGENCY (EMERGENCY)
Facility: HOSPITAL | Age: 33
LOS: 1 days | Discharge: HOME | End: 2023-04-28
Attending: EMERGENCY MEDICINE
Payer: COMMERCIAL

## 2023-04-28 VITALS
TEMPERATURE: 98 F | HEART RATE: 112 BPM | OXYGEN SATURATION: 100 % | SYSTOLIC BLOOD PRESSURE: 179 MMHG | DIASTOLIC BLOOD PRESSURE: 113 MMHG | RESPIRATION RATE: 20 BRPM

## 2023-04-28 PROCEDURE — 99285 EMERGENCY DEPT VISIT HI MDM: CPT

## 2023-04-28 PROCEDURE — 99283 EMERGENCY DEPT VISIT LOW MDM: CPT

## 2023-04-28 NOTE — ED ADULT NURSE NOTE - NSFALLRSKINDICTYPE_ED_ALL_ED
From: Denny Loza  To: Daniel Rogers MD  Sent: 8/2/2021 4:14 PM CDT  Subject: Prescription Question    Dr. Ivelisse Kelly, can you please refill my Tamoxifen and Oxybutinin for another year? I neglected to bring this up at my appointment this morning.      Julieta Randle
Intoxication

## 2023-04-28 NOTE — ED ADULT NURSE NOTE - NSSEPSISNEWALTERMENTAL_ED_A_ED
13 to 17 year check:      PARENTAL CONCERNS:  none    PATIENT CONCERNS:  none  SUPPLEMENTS:   [] Yes  [x]  No  Iron  [] Yes  [x]  No  Fluoride  [] Yes  [x]  No  Vitamins    SCHOOL / TEACHER / GRADE:  9th grade tremper  []  Yes  [x]  No  Has the child had a dental check?      GROWTH AND SCHOOL PROGRESS:  Achievements and Goals:  Good grades  Sports:  Baseball wrestling  Peer Relations:  good  Attendance:  n/a     No

## 2023-04-28 NOTE — ED PROVIDER NOTE - OBJECTIVE STATEMENT
33yom took two THC edibles and now complains of being too high. Sleeping on my exam, states he just doesn't want to be high anymore. Offers no specific complaints.

## 2023-04-28 NOTE — ED ADULT NURSE NOTE - OBJECTIVE STATEMENT
pt arrives anxious but redirectable. provided pt with pillow and cup of water to drink. falls precaution in place. provided with warm blanket. denies SI/HI.

## 2023-04-28 NOTE — ED ADULT TRIAGE NOTE - CHIEF COMPLAINT QUOTE
BIBEMS from home s/p eating two 500mg CBD candies. Pt states that he went to the store to buy something for his anxiety that wouldn't get him high, went home, ate the gummies and now feels "very high." Pt is c/o anxiety. Denies SI/HI.

## 2023-04-28 NOTE — ED ADULT NURSE NOTE - NSIMPLEMENTINTERV_GEN_ALL_ED
Implemented All Fall Risk Interventions:  [place] to call system. Call bell, personal items and telephone within reach. Instruct patient to call for assistance. Room bathroom lighting operational. Non-slip footwear when patient is off stretcher. Physically safe environment: no spills, clutter or unnecessary equipment. Stretcher in lowest position, wheels locked, appropriate side rails in place. Provide visual cue, wrist band, yellow gown, etc. Monitor gait and stability. Monitor for mental status changes and reorient to person, place, and time. Review medications for side effects contributing to fall risk. Reinforce activity limits and safety measures with patient and family.

## 2023-04-28 NOTE — ED PROVIDER NOTE - PATIENT PORTAL LINK FT
You can access the FollowMyHealth Patient Portal offered by [facility] by registering at the following website: [email or web address]. By joining [employer]’s FollowMyHealth portal, you will also be able to view your health information using other applications (apps) compatible with our system.

## 2023-04-29 VITALS
TEMPERATURE: 98 F | HEART RATE: 93 BPM | SYSTOLIC BLOOD PRESSURE: 149 MMHG | DIASTOLIC BLOOD PRESSURE: 99 MMHG | RESPIRATION RATE: 19 BRPM | OXYGEN SATURATION: 99 %

## 2023-04-29 NOTE — ED ADULT NURSE REASSESSMENT NOTE - NS ED NURSE REASSESS COMMENT FT1
pt woke up and feels a lot better at this time. states he wants to go home, and has somebody who can pick him up, Dr. Holland made aware.

## 2023-06-22 NOTE — ED PROVIDER NOTE - ATTENDING CONTRIBUTION TO CARE
613193:1-3 Days|| ||00\01||False; 212690:1-3 Days|| ||00\01||False;302302:1-3 Days|| ||00\01||False; seen with acp: well appearing young male, no cardiac risk factors; normal heart and lung sounds; soft non tender abdomen; no calf ttp or swelling; has had cardiac eval in past, both here, and reports being seen at [facility] recently and having an echo; also has outpt cardiology appt pending; ecg is unchanged from prior, likely early repol changes. OK for d/c    I, Vincent Veloz, performed the initial face to face bedside interview with this patient regarding history of present illness, review of symptoms and relevant past medical, social and family history.  I completed an independent physical examination.  I was the initial provider who evaluated this patient. I have signed out the follow up of any pending tests (i.e. labs, radiological studies) to the ACP.  I have communicated the patient’s plan of care and disposition with the ACP.

## 2023-12-26 NOTE — ED ADULT TRIAGE NOTE - BP NONINVASIVE SYSTOLIC (MM HG)
Hello -    Here are my comments about the recent results. Screening for secondary causes of osteoporosis are normal.  Glucose is marked as low. However, in people with no diabetes and feeling fine (eg. no shakiness/ heart racing/ sweating/ dizziness), glucose at 60 is normal.    Please let us know if you have any questions or concerns.    Regards,  Jackson Bryant MD  [phone]

## 2024-02-05 NOTE — ED ADULT TRIAGE NOTE - NSWEIGHTCALCTOOLDRUG_GEN_A_CORE
used

## 2024-09-20 NOTE — ED PROVIDER NOTE - MDM ORDERS SUBMITTED SELECTION
Spoke to daughter Neda Pricila - [phone] - she is HCP as per Neda.  Plan of care discussed via phone. Not Applicable

## 2025-01-07 ENCOUNTER — EMERGENCY (EMERGENCY)
Facility: HOSPITAL | Age: 35
LOS: 1 days | Discharge: LEFT BEFORE DISCHARGE | End: 2025-01-07
Attending: STUDENT IN AN ORGANIZED HEALTH CARE EDUCATION/TRAINING PROGRAM
Payer: COMMERCIAL

## 2025-01-07 VITALS
WEIGHT: 262.35 LBS | TEMPERATURE: 98 F | HEART RATE: 89 BPM | DIASTOLIC BLOOD PRESSURE: 102 MMHG | OXYGEN SATURATION: 99 % | HEIGHT: 68 IN | RESPIRATION RATE: 18 BRPM | SYSTOLIC BLOOD PRESSURE: 165 MMHG

## 2025-01-07 PROCEDURE — 93010 ELECTROCARDIOGRAM REPORT: CPT

## 2025-01-07 PROCEDURE — 99284 EMERGENCY DEPT VISIT MOD MDM: CPT

## 2025-01-07 PROCEDURE — 93005 ELECTROCARDIOGRAM TRACING: CPT

## 2025-01-07 PROCEDURE — L9991: CPT

## 2025-01-07 NOTE — ED PROVIDER NOTE - PRO INTERPRETER NEED 2
Patient called stating that her insurance denied her medication for Xifaxan and they want her to be on a 3 month trial of Lactulose.  Patient requested a call back English

## 2025-01-07 NOTE — ED ADULT TRIAGE NOTE - CHIEF COMPLAINT QUOTE
pt sent from [facility] for elevated /119 in office, hx CVA- 3 years ago, not on any meds, having dizziness & lightheadedness the past few days  A&Ox3, resp wnl pt sent from [facility] for elevated /119 in office, hx CVA- 3 years ago, not on any meds, having dizziness & lightheadedness the past few days  A&Ox3, resp wnl, c/o chest discomfort

## 2025-01-07 NOTE — ED PROVIDER NOTE - CLINICAL SUMMARY MEDICAL DECISION MAKING FREE TEXT BOX
Triage note reviewed.  Orders placed.  Went to interview patient and was not found. Nurse Zackary states she spoke with the patient and went to get a urine container for a sample. When she returned he was no longer in the bed. Looked for patient for 20 mins.

## 2025-01-07 NOTE — ED ADULT NURSE NOTE - CHIEF COMPLAINT QUOTE
pt sent from [facility] for elevated /119 in office, hx CVA- 3 years ago, not on any meds, having dizziness & lightheadedness the past few days  A&Ox3, resp wnl, c/o chest discomfort

## 2025-01-07 NOTE — ED PROVIDER NOTE - QTC
Pt is a 70-year-old female w/ a PMH of HFPEF, bipolar disorder, asthma/COPD, 2L 02 at home, presented to the ED due to positive COVID test and shortness of breath.  Patient was having some cough, congestion since 7/21, got COVID tested 7/23 that was positive. Patient admitted to medicine floors for COPD exacerbation likely due to COVID.     #COPD exacerbation likely due to COVID   -Patient uses up to 4L O2 at home PRN.  - Pt on 4L NC today 7/26, was on 2L 7/25  -c/w inhalers, Albuterol as needed. Standing LABA/ICS and LAMA  -Prednisone 40 mg PO 7/24  - dexamethasone 6mg PO starting 7/25 for 8 days per ID  - Remdesivir for total of 5 days per ID- started 7/23  - O2 sat 93% 7/24 on 2L NC  - O2 sat 97% 7/25 on 2L NC- nurse took off NC and patient went down to 90%  - CXR : No radiographic evidence of acute cardiopulmonary disease.  - monitor off antibiotics  - RVP -    - d-dimer, CRP, ESR, ferritin, LDH- all within normal limits  - attempt to wean O2  - f/u PT    #Chronic venous stasis  -Duplex LE venous-> negative  - wound care: Cleanse bilateral lower legs with soap and water. Pat dry apply Dermaphor, leave open to air twice a day and prn for soiling.   - Recommend follow up with Vascular out patient per wound care    #H/x of HFpEF  -TTE 5/5/24 EF of 60%. Mild (grade 1) diastolic dysfunction. No regional wall motion abnormalities  -not in exacerbation     #HTN  - lisinopril 20mg daily started 7/25 (increased from home dose of 10mg)  - add HCTZ 25mg 7/25 to regimen   - monitor BP  -/62 7/26     #Bipolar disorder  -c/w Abilify / Artane     DVT prophylaxis: Lovenox 40 mg daily sq  PPT ppx: Pantoprazole 40 mg daily  Diet: DASH        Pt is a 70-year-old female w/ a PMH of HFPEF, bipolar disorder, asthma/COPD, 2L 02 at home, presented to the ED due to positive COVID test and shortness of breath.  Patient was having some cough, congestion since 7/21, got COVID tested 7/23 that was positive. Patient admitted to medicine floors for COPD exacerbation likely due to COVID.     #COPD exacerbation likely due to COVID   -Patient uses up to 4L O2 at home PRN.  - Pt on 4L NC today 7/26, was on 2L 7/25  -c/w inhalers, Albuterol as needed. Standing LABA/ICS and LAMA  -Prednisone 40 mg PO 7/24  - dexamethasone 6mg PO starting 7/25 for 8 days per ID  - Remdesivir for total of 5 days per ID- started 7/23  - O2 sat 93% 7/24 on 2L NC  - O2 sat 97% 7/25 on 2L NC- nurse took off NC and patient went down to 90%  - CXR : No radiographic evidence of acute cardiopulmonary disease.  - monitor off antibiotics  - RVP -    - d-dimer, CRP, ESR, ferritin, LDH- all within normal limits  - attempt to wean O2  - f/u PT    #Chronic venous stasis  -Duplex LE venous-> negative  - wound care: Cleanse bilateral lower legs with soap and water. Pat dry apply Dermaphor, leave open to air twice a day and prn for soiling.   - Recommend follow up with Vascular out patient per wound care    #H/x of HFpEF  -TTE 5/5/24 EF of 60%. Mild (grade 1) diastolic dysfunction. No regional wall motion abnormalities  -not in exacerbation     #HTN  - lisinopril 20mg daily started 7/25 (increased from home dose of 10mg)  - add HCTZ 25mg 7/25 to regimen   - monitor BP  -/62 7/26     #Bipolar disorder  -c/w Abilify / Artane     DVT prophylaxis: Lovenox 40 mg daily sq  PPT ppx: Pantoprazole 40 mg daily  Diet: DASH   dispo: subacute rehab per PT     [phone]

## 2025-01-08 ENCOUNTER — EMERGENCY (EMERGENCY)
Facility: HOSPITAL | Age: 35
LOS: 1 days | Discharge: LEFT BEFORE DISCHARGE | End: 2025-01-08
Payer: COMMERCIAL

## 2025-01-08 VITALS
DIASTOLIC BLOOD PRESSURE: 104 MMHG | HEART RATE: 80 BPM | WEIGHT: 257.94 LBS | OXYGEN SATURATION: 98 % | TEMPERATURE: 98 F | SYSTOLIC BLOOD PRESSURE: 154 MMHG | RESPIRATION RATE: 20 BRPM | HEIGHT: 68 IN

## 2025-01-08 PROCEDURE — 93005 ELECTROCARDIOGRAM TRACING: CPT

## 2025-01-08 PROCEDURE — L9991: CPT

## 2025-01-08 PROCEDURE — 93010 ELECTROCARDIOGRAM REPORT: CPT

## 2025-01-08 NOTE — ED ADULT TRIAGE NOTE - GLASGOW COMA SCALE: BEST VERBAL RESPONSE, MLM
-- DO NOT REPLY / DO NOT REPLY ALL --  -- This inbox is not monitored. If this was sent to the wrong provider or department, reroute message to [facility] [employer] pool. --  -- Message is from [facility] ([facility]) --    Message: Patient is completely out of medication, please advise, Thank you .    Pharmacy is calling for medication requested:    Pending Next Scheduled Appointment: 11/19/2024 at 8:20 AM with provider Cinda Fuller MD     Vitamin Supplements Refill Protocol - 12 Month Protocol Passed   Medication: Cholecalciferol (vitamin D3) 125 mcg (5,000 units) capsule  passed protocol.   Last office visit date: 05/14/2024 with provider Cinda Fuller MD   Last Annual Exam: 11/07/2023 with provider Cinda Fuller MD   Next appointment scheduled?: Yes   Number of refills given: 180 capsules  E-Prescribing Status: Receipt confirmed by pharmacy (9/17/2024  4:17 PM CDT)      Seen by prescribing provider or same department within the last 12 months or has a future appt in 3 months    Medication (including dose and sig) on current meds list      (V5) oriented

## 2025-01-08 NOTE — ED ADULT TRIAGE NOTE - CHIEF COMPLAINT QUOTE
Pt arrives to ED c/o slight chest pain/ SOB  " my blood pressure is high... I want some b/p meds to regulate"

## 2025-01-10 DIAGNOSIS — R07.9 CHEST PAIN, UNSPECIFIED: ICD-10-CM

## 2025-01-10 DIAGNOSIS — Z53.21 PROCEDURE AND TREATMENT NOT CARRIED OUT DUE TO PATIENT LEAVING PRIOR TO BEING SEEN BY HEALTH CARE PROVIDER: ICD-10-CM

## 2025-04-17 NOTE — ED ADULT TRIAGE NOTE - DOMESTIC TRAVEL HIGH RISK QUESTION
Pt scheduled his annual PE for 10/27/25 pt stated you ordered labs today just not sure if you want additional labs for the PE  BW?  Pt stated he will go to  Lab   No

## 2025-06-16 ENCOUNTER — APPOINTMENT (OUTPATIENT)
Dept: ORTHOPEDIC SURGERY | Facility: CLINIC | Age: 35
End: 2025-06-16